# Patient Record
Sex: FEMALE | Race: OTHER | Employment: FULL TIME | URBAN - METROPOLITAN AREA
[De-identification: names, ages, dates, MRNs, and addresses within clinical notes are randomized per-mention and may not be internally consistent; named-entity substitution may affect disease eponyms.]

---

## 2018-09-10 ENCOUNTER — OFFICE VISIT (OUTPATIENT)
Dept: FAMILY MEDICINE CLINIC | Age: 28
End: 2018-09-10

## 2018-09-10 VITALS
SYSTOLIC BLOOD PRESSURE: 100 MMHG | DIASTOLIC BLOOD PRESSURE: 63 MMHG | TEMPERATURE: 98.1 F | HEIGHT: 63 IN | HEART RATE: 58 BPM | RESPIRATION RATE: 16 BRPM | OXYGEN SATURATION: 100 % | BODY MASS INDEX: 18.71 KG/M2 | WEIGHT: 105.6 LBS

## 2018-09-10 DIAGNOSIS — Z00.00 ENCOUNTER FOR PREVENTATIVE ADULT HEALTH CARE EXAMINATION: ICD-10-CM

## 2018-09-10 DIAGNOSIS — Z00.00 LABORATORY TESTS ORDERED AS PART OF A COMPLETE PHYSICAL EXAM (CPE): Primary | ICD-10-CM

## 2018-09-10 NOTE — PROGRESS NOTES
Fred Solorzano is a 25 y.o. female who presents to \[facility]\"" care Patient was previously receiving care at her PCP in [place]. She moved to [place] 1 year ago. Currently not having any acute concerns Review of Systems Review of Systems Constitutional: Negative for fever, malaise/fatigue and weight loss. HENT: Negative for congestion and sore throat. Eyes: Negative for double vision, photophobia and discharge. Respiratory: Negative for cough, shortness of breath and wheezing. Cardiovascular: Negative for chest pain, palpitations and leg swelling. Gastrointestinal: Negative for abdominal pain, constipation, diarrhea, nausea and vomiting. Genitourinary: Negative for dysuria and urgency. Musculoskeletal: Positive for back pain. Negative for joint pain and myalgias. Chronic lower left back pain due to 'curve in back' Skin: Positive for itching. Some itching of small skin tags on right neck Neurological: Negative for dizziness, focal weakness, weakness and headaches. Psychiatric/Behavioral: Negative. Current medications include: No current outpatient prescriptions on file. OB/Gyn History LMP: 2018 Pt's menstrual cycle is regular, every 28 days Birth Control: Tubal Ligation in  Last Pap Smear: 2 years ago, no history of abnormal findings on Pap per patient [id number] G1  female @ 40wks via , no complications G2  male @ 39wks via , no complications G3 200 male @ 40 wks via , no complications Allergies No Known Allergies Past Medical History Past Medical History:  
Diagnosis Date  Scoliosis, unspecified Patient told she has an abnormal curve to her lower back, undergoes physical therapy for lower back pain Past Surgical History Past Surgical History:  
Procedure Laterality Date  HX HERNIA REPAIR    HX TUBAL LIGATION  2015 Family History Family History Problem Relation Age of Onset  Thyroid Disease Mother  Diabetes Maternal Grandmother  Diabetes Maternal Grandfather  Thyroid Disease Sister No FH of breast cancer: No 
No FH of colon cancer: No 
 
Social History Social History  Marital status: , lives at home with  Spouse name: N/A  
 Number of children: 3  
 Years of education: N/A Occupational History [employer]  
  works night shift Social History Main Topics  Smoking status: Never Smoker  Smokeless tobacco: Never Used  Alcohol use No  
 Drug use: No  
 Sexual activity: Yes  
  Partners: Male Immunizations There is no immunization history on file for this patient. Flu Vaccine: Pt will get vaccine at work Health Maintenance Colonoscopy: Not indicated DEXA scan: Not indicated HIV testing: Per patient, negative Hepatitis C testing: Per patient, negative Mammogram: Not indicated Lung cancer screening: Not indicated Objective Vitals Visit Vitals  /63  Pulse (!) 58  Temp 98.1 °F (36.7 °C) (Oral)  Resp 16  
 Ht 5' 3\" (1.6 m)  Wt 105 lb 9.6 oz (47.9 kg)  LMP 09/05/2018  SpO2 100%  BMI 18.71 kg/m2 Physical Exam 
General appearance - alert, well appearing, and in no distress Eyes - pupils equal and reactive, extraocular eye movements intact Ears - bilateral TM's and external ear canals normal 
Nose - normal and patent, no erythema, discharge or polyps Mouth - mucous membranes moist, pharynx normal without lesions Neck - supple, no significant adenopathy, thyroid exam: thyroid is normal in size without nodules or tenderness Chest - clear to auscultation, no wheezes, rales or rhonchi, symmetric air entry Heart - normal rate, regular rhythm, normal S1, S2, no murmurs, rubs, clicks or gallops Abdomen - soft, nontender, nondistended, no masses or organomegaly Neurological - alert, oriented, normal speech, no focal findings or movement disorder noted Musculoskeletal - no joint tenderness, deformity or swelling Extremities - peripheral pulses normal, no pedal edema, no clubbing or cyanosis Skin - normal coloration and turgor, no rashes, no suspicious skin lesions noted Assessment:  
 
Ms. Fransisco Lockett is a 25 y.o. female here to establish care Plan: 1. Encounter for preventative adult health care examination · Patient deferred Pap smear today as she on her menses. She will schedule another appointment for her pap. · Counseled re: diet, exercise, healthy lifestyle · Patient requested referral to Physical Therapy for back pain. Referral ordered at this visit. · Appropriate labs ordered as listed below · Patient will sign medical record release form today to obtain her previous PCP records. 2. Laboratory tests ordered as part of a complete physical exam (CPE) · LIPID PANEL 
· CBC W/O DIFF 
· METABOLIC PANEL, BASIC · TSH 3RD GENERATION Follow-up Disposition: 
Return As needed and for well woman check. I discussed the aforementioned diagnoses with the patient as well as the plan of care. I discussed this patient with Dr. Rebeca Munson (Attending Physician) Signed By:  Gilmer Guerra MD  
 Family Medicine Resident, PGY1

## 2018-09-11 ENCOUNTER — LAB ONLY (OUTPATIENT)
Dept: FAMILY MEDICINE CLINIC | Age: 28
End: 2018-09-11

## 2018-09-11 NOTE — MR AVS SNAPSHOT
[address] 
[phone] Patient: Guerline Silva MRN: [id number] GGD:2/83/3232 Visit Information Date & Time Provider Department Dept. Phone Encounter #  
 9/11/2018 10:30 AM LAB [facility] [phone] [id number] Your Appointments 9/19/2018  9:45 AM  
Complete Physical with Lauren Dozier MD  
[address]) Appt Note: cpe, requesting pap smear [address] [facility] [address]  
[phone]  
  
   
 [address] [phone] Upcoming Health Maintenance Date Due DTaP/Tdap/Td series (1 - Tdap) 6/11/2009 PAP AKA CERVICAL CYTOLOGY 6/11/2009 Influenza Age 5 to Adult 8/1/2018 Allergies as of 9/11/2018  Review Complete On: 9/10/2018 By: Chevy Chu MD  
 No Known Allergies Current Immunizations  Never Reviewed No immunizations on file. Not reviewed this visit Vitals LMP Smoking Status 09/05/2018 Never Smoker Preferred Pharmacy Pharmacy Name Phone [facility] [address] [place] [address] Road [phone] Your Updated Medication List  
  
Notice  As of 9/11/2018  4:56 PM  
 You have not been prescribed any medications. Introducing [facility]! [employer] introduces ONtheAIR patient portal. Now you can access parts of your medical record, email your doctor's office, and request medication refills online. 1. In your internet browser, go to [email or web address] 2. Click on the First Time User? Click Here link in the Sign In box. You will see the New Member Sign Up page. 3. Enter your ONtheAIR Access Code exactly as it appears below. You will not need to use this code after youve completed the sign-up process.  If you do not sign up before the expiration date, you must request a new code. 
 
· Venturesity Access Code: [email or web address] Expires: 12/9/2018  1:26 PM 
 
4. Enter the last four digits of your Social Security Number (xxxx) and Date of Birth (mm/dd/yyyy) as indicated and click Submit. You will be taken to the next sign-up page. 5. Create a Venturesity ID. This will be your Venturesity login ID and cannot be changed, so think of one that is secure and easy to remember. 6. Create a Venturesity password. You can change your password at any time. 7. Enter your Password Reset Question and Answer. This can be used at a later time if you forget your password. 8. Enter your e-mail address. You will receive e-mail notification when new information is available in [address] 9. Click Sign Up. You can now view and download portions of your medical record. 10. Click the Download Summary menu link to download a portable copy of your medical information. If you have questions, please visit the Frequently Asked Questions section of the Venturesity website. Remember, Venturesity is NOT to be used for urgent needs. For medical emergencies, dial 911. Now available from your iPhone and Android! Please provide this summary of care documentation to your next provider. If you have any questions after today's visit, please call [phone].

## 2018-09-12 LAB
BUN SERPL-MCNC: 8 MG/DL (ref 6–20)
BUN/CREAT SERPL: 10 (ref 9–23)
CALCIUM SERPL-MCNC: 9.2 MG/DL (ref 8.7–10.2)
CHLORIDE SERPL-SCNC: 103 MMOL/L (ref 96–106)
CHOLEST SERPL-MCNC: 136 MG/DL (ref 100–199)
CO2 SERPL-SCNC: 22 MMOL/L (ref 20–29)
CREAT SERPL-MCNC: 0.83 MG/DL (ref 0.57–1)
ERYTHROCYTE [DISTWIDTH] IN BLOOD BY AUTOMATED COUNT: 12.5 % (ref 12.3–15.4)
GLUCOSE SERPL-MCNC: 82 MG/DL (ref 65–99)
HCT VFR BLD AUTO: 34.7 % (ref 34–46.6)
HDLC SERPL-MCNC: 58 MG/DL
HGB BLD-MCNC: 11.5 G/DL (ref 11.1–15.9)
INTERPRETATION, 910389: NORMAL
LDLC SERPL CALC-MCNC: 67 MG/DL (ref 0–99)
MCH RBC QN AUTO: 29.7 PG (ref 26.6–33)
MCHC RBC AUTO-ENTMCNC: 33.1 G/DL (ref 31.5–35.7)
MCV RBC AUTO: 90 FL (ref 79–97)
PLATELET # BLD AUTO: 303 X10E3/UL (ref 150–379)
POTASSIUM SERPL-SCNC: 3.4 MMOL/L (ref 3.5–5.2)
RBC # BLD AUTO: 3.87 X10E6/UL (ref 3.77–5.28)
SODIUM SERPL-SCNC: 140 MMOL/L (ref 134–144)
TRIGL SERPL-MCNC: 54 MG/DL (ref 0–149)
TSH SERPL DL<=0.005 MIU/L-ACNC: 1.57 UIU/ML (ref 0.45–4.5)
VLDLC SERPL CALC-MCNC: 11 MG/DL (ref 5–40)
WBC # BLD AUTO: 7.3 X10E3/UL (ref 3.4–10.8)

## 2018-09-13 NOTE — PROGRESS NOTES
CBC, BMP, TSH and Lipid panel all within normal limits. I have filled out the patient's work form with these values and will fax it to the number provided I will also return the original form and a copy of these results to the patient's home address.

## 2018-09-19 ENCOUNTER — OFFICE VISIT (OUTPATIENT)
Dept: FAMILY MEDICINE CLINIC | Age: 28
End: 2018-09-19

## 2018-09-19 ENCOUNTER — HOSPITAL ENCOUNTER (OUTPATIENT)
Dept: LAB | Age: 28
Discharge: HOME | End: 2018-09-19
Payer: COMMERCIAL

## 2018-09-19 VITALS
HEIGHT: 63 IN | SYSTOLIC BLOOD PRESSURE: 90 MMHG | TEMPERATURE: 97.7 F | HEART RATE: 61 BPM | RESPIRATION RATE: 16 BRPM | WEIGHT: 106.6 LBS | DIASTOLIC BLOOD PRESSURE: 53 MMHG | OXYGEN SATURATION: 98 % | BODY MASS INDEX: 18.89 KG/M2

## 2018-09-19 DIAGNOSIS — Z12.4 CERVICAL CANCER SCREENING: ICD-10-CM

## 2018-09-19 DIAGNOSIS — R39.15 URGENCY OF URINATION: Primary | ICD-10-CM

## 2018-09-19 DIAGNOSIS — N84.2 VAGINAL POLYP: ICD-10-CM

## 2018-09-19 DIAGNOSIS — Z11.3 SCREEN FOR STD (SEXUALLY TRANSMITTED DISEASE): ICD-10-CM

## 2018-09-19 LAB
BILIRUB UR QL STRIP: NEGATIVE
GLUCOSE UR-MCNC: NEGATIVE MG/DL
HCG URINE, QL. (POC): NEGATIVE
KETONES P FAST UR STRIP-MCNC: NEGATIVE MG/DL
PH UR STRIP: 6.5 [PH] (ref 4.6–8)
PROT UR QL STRIP: NEGATIVE
SP GR UR STRIP: 1 (ref 1–1.03)
UA UROBILINOGEN AMB POC: NORMAL (ref 0.2–1)
URINALYSIS CLARITY POC: CLEAR
URINALYSIS COLOR POC: YELLOW
URINE BLOOD POC: NEGATIVE
URINE LEUKOCYTES POC: NEGATIVE
URINE NITRITES POC: NEGATIVE
VALID INTERNAL CONTROL?: YES

## 2018-09-19 PROCEDURE — 87491 CHLMYD TRACH DNA AMP PROBE: CPT | Performed by: FAMILY MEDICINE

## 2018-09-19 PROCEDURE — 88175 CYTOPATH C/V AUTO FLUID REDO: CPT | Performed by: FAMILY MEDICINE

## 2018-09-19 PROCEDURE — 87624 HPV HI-RISK TYP POOLED RSLT: CPT | Performed by: FAMILY MEDICINE

## 2018-09-19 PROCEDURE — 87625 HPV TYPES 16 & 18 ONLY: CPT | Performed by: FAMILY MEDICINE

## 2018-09-19 NOTE — PROGRESS NOTES
Davis Baumgarten is a 25 y.o. female  Chief Complaint   Patient presents with    Well Woman     requesting pap smear    Urgency     x1 day and lower abdominal pain x1 day     Visit Vitals    BP 90/53 (BP 1 Location: Right arm, BP Patient Position: Sitting)    Pulse 61    Temp 97.7 °F (36.5 °C) (Oral)    Resp 16    Ht 5' 3\" (1.6 m)    Wt 106 lb 9.6 oz (48.4 kg)    LMP 09/05/2018    SpO2 98%    BMI 18.88 kg/m2     1. Have you been to the ER, urgent care clinic since your last visit? Hospitalized since your last visit? No    2. Have you seen or consulted any other health care providers outside of the [address] [place] [address] since your last visit? Include any pap smears or colon screening.  No  Health Maintenance Due   Topic Date Due    DTaP/Tdap/Td series (1 - Tdap) 06/11/2009    PAP AKA CERVICAL CYTOLOGY  06/11/2009    Influenza Age 9 to Adult  08/01/2018

## 2018-09-19 NOTE — PROGRESS NOTES
History of Present Illness:     Chief Complaint   Patient presents with    Well Woman     requesting pap smear    Urgency     x1 day and lower abdominal pain x1 day     Pt is a 25y.o. year old female    Presents to clinic for pap. Well woman visit done on 9/10. Pap deferred due to being on menses at last visit  Last pap 2 years ago in [place]  Hx of abnormal pap; told her last pap she had HPV   Never needed cervical biopsy or procedure    Currently sexually active with   Desires STD testing    Started having discomfort with urination  Noted yesterday  Symptoms resolved today    Past Medical History:   Diagnosis Date    Scoliosis, unspecified     Patient told she has an abnormal curve to her lower back, undergoes physical therapy for lower back pain         No current outpatient prescriptions on file prior to visit. No current facility-administered medications on file prior to visit. Allergies:  No Known Allergies      Review of Systems:  Denies vaginal discharge, vaginal irritation or itching  +Vaginal pain occasionally      Objective:     Vitals:    09/19/18 0941   BP: 90/53   Pulse: 61   Resp: 16   Temp: 97.7 °F (36.5 °C)   TempSrc: Oral   SpO2: 98%   Weight: 106 lb 9.6 oz (48.4 kg)   Height: 5' 3\" (1.6 m)       Physical Exam:  General appearance - alert, well appearing, and in no distress  Pelvic - VULVA: normal appearing vulva with no masses, tenderness or lesions, VAGINA: normal appearing vagina with normal color and discharge, small, 1cm polyp protruding from introitus , CERVIX: normal appearing cervix without discharge or lesions, UTERUS: uterus is normal size, shape, consistency and nontender, ADNEXA: normal adnexa in size, nontender and no masses, exam chaperoned by Tang Johnson LPN.       Recent Labs:  Recent Results (from the past 12 hour(s))   AMB POC URINALYSIS DIP STICK AUTO W/O MICRO    Collection Time: 09/19/18 10:02 AM   Result Value Ref Range    Color (UA POC) Yellow Clarity (UA POC) Clear     Glucose (UA POC) Negative Negative    Bilirubin (UA POC) Negative Negative    Ketones (UA POC) Negative Negative    Specific gravity (UA POC) 1.005 1.001 - 1.035    Blood (UA POC) Negative Negative    pH (UA POC) 6.5 4.6 - 8.0    Protein (UA POC) Negative Negative    Urobilinogen (UA POC) 0.2 mg/dL 0.2 - 1    Nitrites (UA POC) Negative Negative    Leukocyte esterase (UA POC) Negative Negative   AMB POC URINE PREGNANCY TEST, VISUAL COLOR COMPARISON    Collection Time: 09/19/18 10:43 AM   Result Value Ref Range    VALID INTERNAL CONTROL POC Yes     HCG urine, Ql. (POC) Negative Negative         Assessment and Plan:   Pt is a 25y.o. year old female,      ICD-10-CM ICD-9-CM    1. Urgency of urination R39.15 788.63 AMB POC URINALYSIS DIP STICK AUTO W/O MICRO      AMB POC URINE PREGNANCY TEST, VISUAL COLOR COMPARISON   2. Cervical cancer screening Z12.4 V76.2 PAP IG, CT-NG-TV, APTIMA HPV AND RFX 71/07,98([id number])   3. Screen for STD (sexually transmitted disease) Z11.3 V74.5 HIV 1/2 AG/AB, 4TH GENERATION,W RFLX CONFIRM   4. Vaginal polyp N84.2 623.7      Pap + Gc/Chl done today  HIV ordered    TDaP in 2014 with last pregnancy  Wants to get flu shot at work    Referred to Dr. Tucker Curtis for evaluation of vaginal polyp and possible removal.     Jalen Arango MD      I have discussed the diagnosis with the patient and the intended plan as seen in the above orders. The patient has received an after-visit summary and questions were answered concerning future plans.

## 2018-09-19 NOTE — MR AVS SNAPSHOT
[address] 
[phone] Patient: Earl Grossman MRN: [id number] HBQ:9/67/7981 Visit Information Date & Time Provider Department Dept. Phone Encounter #  
 9/19/2018  9:45 AM Rebeca Ramos, [facility] [phone] [id number] Follow-up Instructions Return for Vaginal polyp with Dr. Tracy Hyatt. Upcoming Health Maintenance Date Due Influenza Age 5 to Adult 11/19/2018* DTaP/Tdap/Td series (1 - Tdap) 12/19/2018* PAP AKA CERVICAL CYTOLOGY 9/19/2021 *Topic was postponed. The date shown is not the original due date. Allergies as of 9/19/2018  Review Complete On: 9/19/2018 By: Edgar Ramos No Known Allergies Current Immunizations  Never Reviewed No immunizations on file. Not reviewed this visit You Were Diagnosed With   
  
 Codes Comments Urgency of urination    -  Primary ICD-10-CM: R39.15 ICD-9-CM: 410.82 Cervical cancer screening     ICD-10-CM: Z12.4 ICD-9-CM: V76.2 Screen for STD (sexually transmitted disease)     ICD-10-CM: Z11.3 ICD-9-CM: V74.5 Vaginal polyp     ICD-10-CM: N12.5 ICD-9-CM: 623.7 Vitals BP Pulse Temp Resp Height(growth percentile) Weight(growth percentile) 90/53 (BP 1 Location: Right arm, BP Patient Position: Sitting) 61 97.7 °F (36.5 °C) (Oral) 16 5' 3\" (1.6 m) 106 lb 9.6 oz (48.4 kg) LMP SpO2 BMI Smoking Status 09/05/2018 98% 18.88 kg/m2 Never Smoker Vitals History BMI and BSA Data Body Mass Index Body Surface Area  
 18.88 kg/m 2 1.47 m 2 Preferred Pharmacy Pharmacy Name Phone [facility] [address] [place] [address] Eduardo Villarreal [phone] Your Updated Medication List  
  
Notice  As of 9/19/2018 10:22 AM  
 You have not been prescribed any medications. We Performed the Following AMB POC URINALYSIS DIP STICK AUTO W/O MICRO [98568 CPT(R)] AMB POC URINE PREGNANCY TEST, VISUAL COLOR COMPARISON [65047 CPT(R)] HIV 1/2 AG/AB, 4TH GENERATION,W RFLX CONFIRM H976192 CPT(R)] PAP IG, CT-NG-TV, APTIMA HPV AND RFX 51/88,31([id number]) [[id number] Custom] Follow-up Instructions Return for Vaginal polyp with Dr. Robinson Ortega! [employer] introduces UpCity patient portal. Now you can access parts of your medical record, email your doctor's office, and request medication refills online. 1. In your internet browser, go to [email or web address] 2. Click on the First Time User? Click Here link in the Sign In box. You will see the New Member Sign Up page. 3. Enter your UpCity Access Code exactly as it appears below. You will not need to use this code after youve completed the sign-up process. If you do not sign up before the expiration date, you must request a new code. · UpCity Access Code: [email or web address] Expires: 12/9/2018  1:26 PM 
 
4. Enter the last four digits of your Social Security Number (xxxx) and Date of Birth (mm/dd/yyyy) as indicated and click Submit. You will be taken to the next sign-up page. 5. Create a UpCity ID. This will be your UpCity login ID and cannot be changed, so think of one that is secure and easy to remember. 6. Create a UpCity password. You can change your password at any time. 7. Enter your Password Reset Question and Answer. This can be used at a later time if you forget your password. 8. Enter your e-mail address. You will receive e-mail notification when new information is available in [address] 9. Click Sign Up. You can now view and download portions of your medical record. 10. Click the Download Summary menu link to download a portable copy of your medical information.  
 
If you have questions, please visit the Frequently Asked Questions section of the PromoRepublic. Remember, [employer] is NOT to be used for urgent needs. For medical emergencies, dial 911. Now available from your iPhone and Android! Please provide this summary of care documentation to your next provider. If you have any questions after today's visit, please call [phone].

## 2018-09-20 LAB — HIV 1+2 AB+HIV1 P24 AG SERPL QL IA: NON REACTIVE

## 2018-09-21 LAB
C TRACH RRNA SPEC QL NAA+PROBE: NEGATIVE
N GONORRHOEA RRNA SPEC QL NAA+PROBE: NEGATIVE
SPECIMEN SOURCE: NORMAL
T VAGINALIS RRNA SPEC QL NAA+PROBE: NEGATIVE

## 2018-09-25 ENCOUNTER — TELEPHONE (OUTPATIENT)
Dept: FAMILY MEDICINE CLINIC | Age: 28
End: 2018-09-25

## 2018-09-25 NOTE — TELEPHONE ENCOUNTER
Took call from Becca. Patient returning call in regards to lab results. Informed patient per Dr Krish Herron that her Gc/Chl was negative and we are still awaiting her Pap results. Patient verbalized understanding.

## 2018-09-25 NOTE — TELEPHONE ENCOUNTER
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     Nurse Rukhsana Ngo took call

## 2018-09-25 NOTE — TELEPHONE ENCOUNTER
Attempted to call patient per Dr Monique Gr to inform patient of negative Gc/Chl and Pap result to follow. Unable to obtain patient, LVM to return call.

## 2018-09-27 NOTE — PROGRESS NOTES
Normal pap with POSITIVE HPV  Negative for HR HPV strains  Repeat co-testing in 1 year    Please call and notify patient the following:  - Normal cervical cells on pap  - HPV testing was positive  - Needs repeat pap in 1 year

## 2018-09-28 ENCOUNTER — TELEPHONE (OUTPATIENT)
Dept: FAMILY MEDICINE CLINIC | Age: 28
End: 2018-09-28

## 2018-09-28 NOTE — TELEPHONE ENCOUNTER
Verified patient by 2 identifiers. Informed patient per Dr Vikas Pyle of her Pap results: normal cervical cells on pap; HPV testing was positive; and needs to be repeated in 1 year. Patient verbalized understanding of results but questioned the results of her HIV testing. Informed her per Dr Vikas Pyle notes her HIV testing was negative. Patient verbalized understanding.

## 2018-10-01 PROBLEM — R87.820 PAPANICOLAOU SMEAR OF CERVIX WITH LOW RISK HUMAN PAPILLOMAVIRUS (HPV) DNA TEST POSITIVE: Status: ACTIVE | Noted: 2018-10-01

## 2018-10-18 ENCOUNTER — OFFICE VISIT (OUTPATIENT)
Dept: FAMILY MEDICINE CLINIC | Age: 28
End: 2018-10-18

## 2018-10-18 ENCOUNTER — HOSPITAL ENCOUNTER (OUTPATIENT)
Dept: LAB | Age: 28
Discharge: HOME | End: 2018-10-18

## 2018-10-18 VITALS
HEART RATE: 56 BPM | WEIGHT: 109 LBS | SYSTOLIC BLOOD PRESSURE: 101 MMHG | HEIGHT: 63 IN | RESPIRATION RATE: 16 BRPM | OXYGEN SATURATION: 100 % | BODY MASS INDEX: 19.31 KG/M2 | DIASTOLIC BLOOD PRESSURE: 66 MMHG | TEMPERATURE: 97.9 F

## 2018-10-18 DIAGNOSIS — N84.2 VAGINAL POLYP: ICD-10-CM

## 2018-10-18 DIAGNOSIS — L91.8 SKIN TAGS, MULTIPLE ACQUIRED: ICD-10-CM

## 2018-10-18 DIAGNOSIS — B36.0 TINEA VERSICOLOR: Primary | ICD-10-CM

## 2018-10-18 RX ORDER — FLUCONAZOLE 150 MG/1
TABLET ORAL
Qty: 4 TAB | Refills: 0 | Status: SHIPPED | OUTPATIENT
Start: 2018-10-18 | End: 2019-06-06

## 2018-10-18 NOTE — PROGRESS NOTES
Chief Complaint Patient presents with  Gyn Exam  
 
1. Have you been to the ER, urgent care clinic since your last visit? Hospitalized since your last visit? No 
 
2. Have you seen or consulted any other health care providers outside of the [address] [place] [address] since your last visit? Include any pap smears or colon screening.  No

## 2018-10-18 NOTE — PATIENT INSTRUCTIONS
Tinea Versicolor: Care Instructions Your Care Instructions Tinea versicolor is a skin infection caused by a yeast (fungus). It causes many small spots, usually on the chest and back. The spotted skin can be flaky or scaly. The spots do not tan in the sun, so they are lighter than the skin around them. Some spots may be darker than the skin around them. The yeast that causes tinea versicolor normally lives on your skin. But it becomes a problem only when warmth and humidity allow the yeast to grow rapidly and increase in number. Some people are more likely to get tinea versicolor. It does not spread from person to person. Tinea versicolor usually gets better as you age. You can treat tinea versicolor with cream or ointment that kills the yeast. You may need pills to kill the fungus if the spots cover a lot of your body. Although treatment kills the yeast quickly, your skin may not return to normal for months after treatment. You can get this condition again after treatment. Follow-up care is a key part of your treatment and safety. Be sure to make and go to all appointments, and call your doctor if you are having problems. It's also a good idea to know your test results and keep a list of the medicines you take. How can you care for yourself at home? · Follow the directions for use of creams, shampoos, or solutions. You will probably need to use them for 1 to 2 weeks. If your skin gets irritated, stop using the product, and call your doctor. · To prevent tinea versicolor, use a cream, shampoo, or solution one time a month. Your doctor may prescribe pills to prevent the spots from returning. · Dry off well after bathing. Keep your skin clean and dry. · Always wear sunscreen on exposed skin. Make sure to use a broad-spectrum sunscreen that has a sun protection factor (SPF) of 30 or higher. Use it every day, even when it is cloudy.  
· If you keep getting tinea versicolor, wash your clothes in very hot water to kill the yeast. 
When should you call for help? Call your doctor now or seek immediate medical care if: 
  · You have signs of infection such as: 
? Pain, warmth, or swelling in your skin. ? Red streaks near a wound in your skin. ? Pus coming from a wound in your skin. ? A fever.  
 Watch closely for changes in your health, and be sure to contact your doctor if: 
  · Your skin condition does not improve in 2 weeks.  
  · You do not get better as expected. Where can you learn more? Go to http://hayes-daryl.info/. Enter L216 in the search box to learn more about \"Tinea Versicolor: Care Instructions. \" Current as of: April 18, 2018 Content Version: 11.8 © 6948-6557 [employer]. Care instructions adapted under license by [employer] [place] (which disclaims liability or warranty for this information). If you have questions about a medical condition or this instruction, always ask your healthcare professional. [place] [address] any warranty or liability for your use of this information.

## 2018-10-18 NOTE — PROGRESS NOTES
Subjective:  
Anabel Adam is a 25 y.o. female who presents with concerns about vaginal polyp. Current symptoms are: pt told she has a vaginal polyp and was referred to me for further evaluation and possible biopsy. Unknown duration of time, but has noticed it when trying to insert tampon or when having sex. Seems there's some resistance though no real pain. Pt wonders if it started after a laceration repair following  in the past.  Last delivery was in . HPV+, NILM pap in 2018. Was told she needed repeat pap in 1 year. STD exposure: denies knowledge of risky exposure. Previous STD history: none Also wants to discuss multiple skin tags on neck area. Feels like she's getting more. Thinks it could be related to being in the sun. No bleeding or irritation. Also with a \"white\" rash on her shoulders and chest, down to abdomen. Thinks it's versicolor (looked it up online) and was treated by another doctor topically, including with Head and Shoulders shampoo. States it didn't help. Spreading. Allergies- reviewed:  
No Known Allergies Medications- reviewed:  
Current Outpatient Medications Medication Sig  
 fluconazole (DIFLUCAN) 150 mg tablet Take 2 tabs by mouth once weekly for 2 weeks No current facility-administered medications for this visit. Past Medical History- reviewed: 
Past Medical History:  
Diagnosis Date  Scoliosis, unspecified Patient told she has an abnormal curve to her lower back, undergoes physical therapy for lower back pain Past Surgical History- reviewed:  
Past Surgical History:  
Procedure Laterality Date  HX HERNIA REPAIR    HX TUBAL LIGATION   Social History- reviewed: 
Social History Socioeconomic History  Marital status: SINGLE Spouse name: Not on file  Number of children: 3  
 Years of education: Not on file  Highest education level: Not on file Social Needs  Financial resource strain: Not on file  Food insecurity - worry: Not on file  Food insecurity - inability: Not on file  Transportation needs - medical: Not on file  Transportation needs - non-medical: Not on file Occupational History  Occupation: Housekeeping Employer: [employer] Comment: works night shift Tobacco Use  Smoking status: Never Smoker  Smokeless tobacco: Never Used Substance and Sexual Activity  Alcohol use: No  
 Drug use: No  
 Sexual activity: Yes  
  Partners: Male Comment: tubal ligation, 2015 Other Topics Concern  Not on file Social History Narrative  Not on file Review of Systems General: No fevers or chills GI: No abdominal pain, nausea, or vomiting Skin: hypopigmented rash, and multiple skin tags Physical Exam  
 
Visit Vitals /66 Pulse (!) 56 Temp 97.9 °F (36.6 °C) (Oral) Resp 16 Ht 5' 3\" (1.6 m) Wt 109 lb (49.4 kg) LMP 10/01/2018 (Approximate) SpO2 100% BMI 19.31 kg/m² General: No apparent distress. Alert and oriented. Responds to all questions appropriately. Skin:  Multiple freckles and hyperpigmented pinpoint skin tags on neck area. There is a diffuse hypopigmented blotchy dermatitis on shoulders extending to mid back and chest.    
: Exam chaperoned by Tayla Phillip LPN. There is a think white discharge present. Speculum exam normal with normal appearing pink vaginal tissue. There is a area of protruding tissue starting at the level of the perineum and protruding slightly from the introitus. Pink/flesh colored in appearance without irregular borders. [facility] MEDICINE CTROFFICE PROCEDURE PROGRESS NOTE Chart reviewed for the following: 
 Wolf HODGSON 81, DO, have reviewed the History, Physical and updated the Allergic reactions for MEAGAN Lu TIME OUT performed immediately prior to start of procedure: IAndrade DO, have performed the following reviews on Delmi Lee prior to the start of the procedure: 
         
* Patient was identified by name and date of birth * Agreement on procedure being performed was verified * Risks and Benefits explained to the patient * Procedure site verified and marked as necessary * Patient was positioned for comfort * Consent was signed and verified Time: 11:15 Date of procedure: 10/18/2018 Procedure performed by: Margie Birmingham DO 
 
Provider assisted by: Gregory Ricardo LPN Patient assisted by: self How tolerated by patient: tolerated the procedure well with no complications Post Procedural Pain Scale: 0 - No Hurt Comments: none Precautions were discussed prior to procedure including bleeding, infection, scarring, scar tissue formation and nerve damage. Pt desired to proceed. All questions answered prior to start of the procedure. Area was fully visualized and a Genoveva Manuel forcep was used to grasp and take biospy of the tissue in normal sterile fashion. There was no bleeding. No complications. Assessment/Plan: ICD-10-CM ICD-9-CM 1. Tinea versicolor B36.0 111.0 fluconazole (DIFLUCAN) 150 mg tablet 2. Skin tags, multiple acquired L91.8 701.9 REFERRAL TO DERMATOLOGY 3. Vaginal polyp N84.2 623.7 SURGICAL PATHOLOGY PLAN:  
Will treat tinea versicolor systemically, as pt has failed topical therapy. Referral to derm for evaluation of skin tag removal given location head/neck. Biopsy taken today of vaginal tissue. Most likely vaginal polyp versus normal vaginal tissue that has healed in an irregular manner following vaginal laceration repair with last . Procedure precautions discussed. No orders of the defined types were placed in this encounter.  
 
 
 
I have discussed the diagnosis with the patient and the intended plan as seen in the above orders. The patient has received an after-visit summary and questions were answered concerning future plans. I have discussed medication side effects and warnings with the patient as well.  
 
Andrade Kahn, DO

## 2018-11-08 ENCOUNTER — TELEPHONE (OUTPATIENT)
Dept: FAMILY MEDICINE CLINIC | Age: 28
End: 2018-11-08

## 2018-11-08 NOTE — TELEPHONE ENCOUNTER
----- Message from Ange Villar DO sent at 11/7/2018  3:06 PM EST -----  Francoise Huang, please let this patient know her biopsy was completely normal.  No need for any further treatment of the area. Thanks  MVyossi     ----- Message -----  From: Lauren [facility] In 7 2.3 Results  Sent: 11/6/2018   1:19 PM  To:  Andrade Kahn DO

## 2018-12-07 ENCOUNTER — OFFICE VISIT (OUTPATIENT)
Dept: FAMILY MEDICINE CLINIC | Age: 28
End: 2018-12-07

## 2018-12-07 VITALS
SYSTOLIC BLOOD PRESSURE: 106 MMHG | TEMPERATURE: 96.2 F | OXYGEN SATURATION: 99 % | RESPIRATION RATE: 18 BRPM | DIASTOLIC BLOOD PRESSURE: 63 MMHG | WEIGHT: 106 LBS | HEIGHT: 63 IN | BODY MASS INDEX: 18.78 KG/M2 | HEART RATE: 60 BPM

## 2018-12-07 DIAGNOSIS — F32.1 MODERATE MAJOR DEPRESSION (HCC): Primary | ICD-10-CM

## 2018-12-07 DIAGNOSIS — M54.32 SCIATICA OF LEFT SIDE: ICD-10-CM

## 2018-12-07 RX ORDER — SERTRALINE HYDROCHLORIDE 50 MG/1
50 TABLET, FILM COATED ORAL DAILY
Qty: 30 TAB | Refills: 1 | Status: SHIPPED | OUTPATIENT
Start: 2018-12-07 | End: 2018-12-20 | Stop reason: SDUPTHER

## 2018-12-07 NOTE — PROGRESS NOTES
[address] [facility], [address] with [address] [facility]     Chief Complaint: Jose G Reach, difficulty sleeping. \"    Subjective  Jenny Browning is an 25 y.o. female who presents for difficulty sleeping. Has been present for 2 weeks. Usually goes to bed at 9:30am every day (works night shift). Wakes up every hour. She is usually up by 2-3pm (sometimes earlier). Never had trouble with sleep before. Worries frequently--having some difficulties with her . Son has ADD. Daughter has issues in school. Was going to therapy, but stopped. Her  wonders if she has depression--never been formerly diagnosed. Also complaining of generalized headaches which seem to worsen when she does not get enough sleep. PHQ over the last two weeks 12/7/2018   Little interest or pleasure in doing things Nearly every day   Feeling down, depressed, irritable, or hopeless More than half the days   Total Score PHQ 2 5   Trouble falling or staying asleep, or sleeping too much Nearly every day   Feeling tired or having little energy Nearly every day   Poor appetite, weight loss, or overeating Several days   Feeling bad about yourself - or that you are a failure or have let yourself or your family down Several days   Trouble concentrating on things such as school, work, reading, or watching TV Several days   Moving or speaking so slowly that other people could have noticed; or the opposite being so fidgety that others notice Not at all   Thoughts of being better off dead, or hurting yourself in some way Not at all   PHQ 9 Score 14     Has a history of sciatica (left-sided). States that this has been worsening over the past several days. Wonders if it is related to her stress. Sees a chriopractor--noted to have scoliosis. Ibuprofen is helping some. No urinary symptoms, saddle anesthesia, fever.     Allergies - reviewed:   No Known Allergies    Medications - reviewed:   Current Outpatient Medications Medication Sig    sertraline (ZOLOFT) 50 mg tablet Take 1 Tab by mouth daily.  fluconazole (DIFLUCAN) 150 mg tablet Take 2 tabs by mouth once weekly for 2 weeks     No current facility-administered medications for this visit. I have reviewed and updated the histories as listed below:    Past Medical History - reviewed:  Past Medical History:   Diagnosis Date    Scoliosis, unspecified     Patient told she has an abnormal curve to her lower back, undergoes physical therapy for lower back pain         Past Surgical History - reviewed:   Past Surgical History:   Procedure Laterality Date    HX HERNIA REPAIR  2015    HX TUBAL LIGATION  2015         Social History - reviewed:  Social History     Socioeconomic History    Marital status: SINGLE     Spouse name: Not on file    Number of children: 3    Years of education: Not on file    Highest education level: Not on file   Social Needs    Financial resource strain: Not on file    Food insecurity - worry: Not on file    Food insecurity - inability: Not on file   [employer] needs - medical: Not on file   Posterous needs - non-medical: Not on file   Occupational History    Occupation: Housekeeping     Employer: [employer] Kristi Gilmore: works night shift    Tobacco Use    Smoking status: Never Smoker    Smokeless tobacco: Never Used   Substance and Sexual Activity    Alcohol use: No    Drug use: No    Sexual activity: Yes     Partners: Male     Comment: tubal ligation, 2015   Other Topics Concern    Not on file   Social History Narrative    Not on file         Family History - reviewed:  Family History   Problem Relation Age of Onset    Thyroid Disease Mother     Diabetes Maternal Grandmother     Diabetes Maternal Grandfather     Thyroid Disease Sister          Immunizations - reviewed: There is no immunization history on file for this patient. Flu: received this season.     Review of Systems  Review of Systems Constitutional: Negative for chills and fever. Respiratory: Negative for shortness of breath. Cardiovascular: Negative for chest pain. Gastrointestinal: Negative for constipation, nausea and vomiting. Musculoskeletal: Positive for back pain. Neurological: Positive for headaches. Psychiatric/Behavioral: Positive for dysphoric mood and sleep disturbance. Negative for suicidal ideas. The patient is nervous/anxious. Physical Exam    Visit Vitals  /63   Pulse 60   Temp 96.2 °F (35.7 °C) (Oral)   Resp 18   Ht 5' 3\" (1.6 m)   Wt 106 lb (48.1 kg)   SpO2 99%   BMI 18.78 kg/m²       Physical Exam   Constitutional: She is oriented to person, place, and time. She appears well-nourished. No distress. HENT:   Head: Normocephalic. Right Ear: Hearing, tympanic membrane, external ear and ear canal normal.   Left Ear: Hearing, tympanic membrane, external ear and ear canal normal.   Mouth/Throat: Oropharynx is clear and moist. No oropharyngeal exudate. Eyes: Conjunctivae and EOM are normal. Pupils are equal, round, and reactive to light. Right eye exhibits no discharge. Left eye exhibits no discharge. Neck: Normal range of motion. Neck supple. No thyromegaly present. Cardiovascular: Normal rate, regular rhythm, normal heart sounds and intact distal pulses. Exam reveals no gallop and no friction rub. No murmur heard. Pulmonary/Chest: Effort normal and breath sounds normal. No respiratory distress. She has no wheezes. She has no rales. She exhibits no tenderness. Abdominal: Soft. Bowel sounds are normal. She exhibits no distension and no mass. There is no tenderness. There is no rebound and no guarding. Musculoskeletal: Normal range of motion. She exhibits no edema. Lumbar back: She exhibits tenderness (left paraspinal tenderness). Lymphadenopathy:     She has no cervical adenopathy. Neurological: She is alert and oriented to person, place, and time. She has normal strength.  No cranial nerve deficit. Skin: Skin is warm and dry. She is not diaphoretic. No erythema. Psychiatric: Her speech is normal and behavior is normal. Judgment and thought content normal. Cognition and memory are normal. She exhibits a depressed mood (tearful). She expresses no homicidal and no suicidal ideation. She expresses no suicidal plans and no homicidal plans. Nursing note and vitals reviewed. Assessment    ICD-10-CM ICD-9-CM    1. Moderate major depression ([facility]) F32.1 296.22 REFERRAL TO PSYCHOLOGY      sertraline (ZOLOFT) 50 mg tablet   2. Sciatica of left side M54.32 724.3      Plan  1. Moderate major depression ([address] - likely cause of sleeping difficulty. Will trial zoloft for now. Referred back to psych. No SI/HI today. Denies any abuse in home. Feels safe there. I will plan to follow up with her in 2 weeks time.  - REFERRAL TO PSYCHOLOGY  - sertraline (ZOLOFT) 50 mg tablet; Take 1 Tab by mouth daily. Dispense: 30 Tab; Refill: 1    2. Sciatica of left side - chronic issue for patient. Following with chiropractor. Recommended OTC anti-inflammatories for now. Can consider short steroid course if still present when she returns in 2 weeks. Follow-up Disposition:  Return in about 2 weeks (around 12/21/2018) for depression follow up. I have discussed the diagnosis with the patient and the intended plan as seen in the above orders. Patient verbalized understanding of the plan and agrees with the plan. The patient has received an after-visit summary and questions were answered concerning future plans. I have discussed medication side effects and warnings with the patient as well. Informed patient to return to the office if new symptoms arise.     Patient was discussed with Dr. Jez Quinn MD  Family Medicine Resident

## 2018-12-07 NOTE — PROGRESS NOTES
[address] Medicine Residency Attending Addendum:  Patient encounter was discussed on the day of the encounter with Bryon Daniels MD, performing the key elements of the service. I discussed the findings, assessment and plan with the resident and agree with the resident's findings and plan as documented in the resident's note.       Deo Cam MD, CAQSM, RMSK

## 2018-12-07 NOTE — PATIENT INSTRUCTIONS
[address] Office  Phone: [phone]  FAX: [phone]  [address] [facility], [address]  Phone: [phone]  FAX: [phone]  201 [facility], [address]  [facility] 1309 [facility] Office  [phone]  FAX: [phone] [address] [facility] 41, [facility]    [facility] Office  [phone]  FAX: 800 [facility], 295 [facility], [facility] [address] [facility] [place] Location  [address]  [place] [address]    [facility] Karlie Freeman, Ph.D  Catrina Carmen [address] [facility], [address] [facility] [address]   [phone]    The [address] [facility] Office  [address] [facility], [address]  [phone]    [facility] Office  [address], 15 [facility]  [phone]    [facility] ([place] near [facility])  [phone]    [address] [facility] and [facility] [employer] - Psychiatrist Takes Medicaid  [address] [place], [address], [address]  200 [facility], LCSW  [address] 2601 [facility] 33  [phone]    Aliyah Gamze (for residents of [facility])  [address] Address  [facility] [address]  Phone Numbers  [phone]    [phone]  Crisis Intervention  24 Hours/Day   [phone]      Recovering From Depression: Care Instructions  Your Care Instructions    Taking good care of yourself is important as you recover from depression. In time, your symptoms will fade as your treatment takes hold. Do not give up. Instead, focus your energy on getting better. Your mood will improve. It just takes some time.  Focus on things that can help you feel better, such as being with friends and family, eating well, and getting enough rest. But take things slowly. Do not do too much too soon. You will begin to feel better gradually. Follow-up care is a key part of your treatment and safety. Be sure to make and go to all appointments, and call your doctor if you are having problems. It's also a good idea to know your test results and keep a list of the medicines you take. How can you care for yourself at home? Be realistic  · If you have a large task to do, break it up into smaller steps you can handle, and just do what you can. · You may want to put off important decisions until your depression has lifted. If you have plans that will have a major impact on your life, such as marriage, divorce, or a job change, try to wait a bit. Talk it over with friends and loved ones who can help you look at the overall picture first.  · Reaching out to people for help is important. Do not isolate yourself. Let your family and friends help you. Find someone you can trust and confide in, and talk to that person. · Be patient, and be kind to yourself. Remember that depression is not your fault and is not something you can overcome with willpower alone. Treatment is necessary for depression, just like for any other illness. Feeling better takes time, and your mood will improve little by little. Stay active  · Stay busy and get outside. Take a walk, or try some other light exercise. · Talk with your doctor about an exercise program. Exercise can help with mild depression. · Go to a movie or concert. Take part in a Jainism activity or other social gathering. Go to a ball game. · Ask a friend to have dinner with you. Take care of yourself  · Eat a balanced diet with plenty of fresh fruits and vegetables, whole grains, and lean protein. If you have lost your appetite, eat small snacks rather than large meals. · Avoid drinking alcohol or using illegal drugs. Do not take medicines that have not been prescribed for you.  They may interfere with medicines you may be taking for depression, or they may make your depression worse. · Take your medicines exactly as they are prescribed. You may start to feel better within 1 to 3 weeks of taking antidepressant medicine. But it can take as many as 6 to 8 weeks to see more improvement. If you have questions or concerns about your medicines, or if you do not notice any improvement by 3 weeks, talk to your doctor. · If you have any side effects from your medicine, tell your doctor. Antidepressants can make you feel tired, dizzy, or nervous. Some people have dry mouth, constipation, headaches, sexual problems, or diarrhea. Many of these side effects are mild and will go away on their own after you have been taking the medicine for a few weeks. Some may last longer. Talk to your doctor if side effects are bothering you too much. You might be able to try a different medicine. · Get enough sleep. If you have problems sleeping:  ? Go to bed at the same time every night, and get up at the same time every morning. ? Keep your bedroom dark and quiet. ? Do not exercise after 5:00 p.m.  ? Avoid drinks with caffeine after 5:00 p.m. · Avoid sleeping pills unless they are prescribed by the doctor treating your depression. Sleeping pills may make you groggy during the day, and they may interact with other medicine you are taking. · If you have any other illnesses, such as diabetes, heart disease, or high blood pressure, make sure to continue with your treatment. Tell your doctor about all of the medicines you take, including those with or without a prescription. · Keep the numbers for these national suicide hotlines: 5-939-172-TALK (9-713.609.3083) and 1-793-ZSHJFMM (8-739.976.6129). If you or someone you know talks about suicide or feeling hopeless, get help right away. When should you call for help? Call 911 anytime you think you may need emergency care.  For example, call if:    · You feel like hurting yourself or someone else.     · Someone you know has depression and is about to attempt or is attempting suicide.   [facility] your doctor now or seek immediate medical care if:    · You hear voices.     · Someone you know has depression and:  ? Starts to give away his or her possessions. ? Uses illegal drugs or drinks alcohol heavily. ? Talks or writes about death, including writing suicide notes or talking about guns, knives, or pills. ? Starts to spend a lot of time alone. ? Acts very aggressively or suddenly appears calm.    Watch closely for changes in your health, and be sure to contact your doctor if:    · You do not get better as expected. Where can you learn more? Go to http://hayesKiwi Semiconductordaryl.info/. Enter W829 in the search box to learn more about \"Recovering From Depression: Care Instructions. \"  Current as of: December 7, 2017  Content Version: 11.8  © 7615-1913 "[employer]. Care instructions adapted under license by [employer] (which disclaims liability or warranty for this information). If you have questions about a medical condition or this instruction, always ask your healthcare professional. [place] [address] any warranty or liability for your use of this information. Low Back Pain: Exercises  Your Care Instructions  Here are some examples of typical rehabilitation exercises for your condition. Start each exercise slowly. Ease off the exercise if you start to have pain. Your doctor or physical therapist will tell you when you can start these exercises and which ones will work best for you. How to do the exercises  Press-up    1. Lie on your stomach, supporting your body with your forearms. 2. Press your elbows down into the floor to raise your upper back. As you do this, relax your stomach muscles and allow your back to arch without using your back muscles. As your press up, do not let your hips or pelvis come off the floor.   3. Hold for 15 to 30 seconds, then relax.  4. Repeat 2 to 4 times. Alternate arm and leg (bird dog) exercise    1. Start on the floor, on your hands and knees. 2. Tighten your belly muscles. 3. Raise one leg off the floor, and hold it straight out behind you. Be careful not to let your hip drop down, because that will twist your trunk. 4. Hold for about 6 seconds, then lower your leg and switch to the other leg. 5. Repeat 8 to 12 times on each leg. 6. Over time, work up to holding for 10 to 30 seconds each time. 7. If you feel stable and secure with your leg raised, try raising the opposite arm straight out in front of you at the same time. Knee-to-chest exercise    1. Lie on your back with your knees bent and your feet flat on the floor. 2. Bring one knee to your chest, keeping the other foot flat on the floor (or keeping the other leg straight, whichever feels better on your lower back). 3. Keep your lower back pressed to the floor. Hold for at least 15 to 30 seconds. 4. Relax, and lower the knee to the starting position. 5. Repeat with the other leg. Repeat 2 to 4 times with each leg. 6. To get more stretch, put your other leg flat on the floor while pulling your knee to your chest.    Curl-ups    1. Lie on the floor on your back with your knees bent at a 90-degree angle. Your feet should be flat on the floor, about 12 inches from your buttocks. 2. Cross your arms over your chest. If this bothers your neck, try putting your hands behind your neck (not your head), with your elbows spread apart. 3. Slowly tighten your belly muscles and raise your shoulder blades off the floor. 4. Keep your head in line with your body, and do not press your chin to your chest.  5. Hold this position for 1 or 2 seconds, then slowly lower yourself back down to the floor. 6. Repeat 8 to 12 times. Pelvic tilt exercise    1. Lie on your back with your knees bent. 2. \"Brace\" your stomach.  This means to tighten your muscles by pulling in and imagining your belly button moving toward your spine. You should feel like your back is pressing to the floor and your hips and pelvis are rocking back. 3. Hold for about 6 seconds while you breathe smoothly. 4. Repeat 8 to 12 times. Heel dig bridging    1. Lie on your back with both knees bent and your ankles bent so that only your heels are digging into the floor. Your knees should be bent about 90 degrees. 2. Then push your heels into the floor, squeeze your buttocks, and lift your hips off the floor until your shoulders, hips, and knees are all in a straight line. 3. Hold for about 6 seconds as you continue to breathe normally, and then slowly lower your hips back down to the floor and rest for up to 10 seconds. 4. Do 8 to 12 repetitions. Hamstring stretch in doorway    1. Lie on your back in a doorway, with one leg through the open door. 2. Slide your leg up the wall to straighten your knee. You should feel a gentle stretch down the back of your leg. 3. Hold the stretch for at least 15 to 30 seconds. Do not arch your back, point your toes, or bend either knee. Keep one heel touching the floor and the other heel touching the wall. 4. Repeat with your other leg. 5. Do 2 to 4 times for each leg. Hip flexor stretch    1. Kneel on the floor with one knee bent and one leg behind you. Place your forward knee over your foot. Keep your other knee touching the floor. 2. Slowly push your hips forward until you feel a stretch in the upper thigh of your rear leg. 3. Hold the stretch for at least 15 to 30 seconds. Repeat with your other leg. 4. Do 2 to 4 times on each side. Wall sit    1. Stand with your back 10 to 12 inches away from a wall. 2. Lean into the wall until your back is flat against it. 3. Slowly slide down until your knees are slightly bent, pressing your lower back into the wall. 4. Hold for about 6 seconds, then slide back up the wall. 5. Repeat 8 to 12 times.     Follow-up care is a key part of your treatment and safety. Be sure to make and go to all appointments, and call your doctor if you are having problems. It's also a good idea to know your test results and keep a list of the medicines you take. Where can you learn more? Go to http://hayes-daryl.info/. Enter S343 in the search box to learn more about \"Low Back Pain: Exercises. \"  Current as of: November 29, 2017  Content Version: 11.8  © 5810-2522 [employer]. Care instructions adapted under license by [employer] (which disclaims liability or warranty for this information). If you have questions about a medical condition or this instruction, always ask your healthcare professional. [address] any warranty or liability for your use of this information.

## 2018-12-20 ENCOUNTER — OFFICE VISIT (OUTPATIENT)
Dept: FAMILY MEDICINE CLINIC | Age: 28
End: 2018-12-20

## 2018-12-20 VITALS
TEMPERATURE: 97.7 F | HEART RATE: 62 BPM | DIASTOLIC BLOOD PRESSURE: 67 MMHG | HEIGHT: 63 IN | WEIGHT: 106 LBS | BODY MASS INDEX: 18.78 KG/M2 | OXYGEN SATURATION: 98 % | SYSTOLIC BLOOD PRESSURE: 103 MMHG | RESPIRATION RATE: 16 BRPM

## 2018-12-20 DIAGNOSIS — F51.05 INSOMNIA DUE TO MENTAL CONDITION: ICD-10-CM

## 2018-12-20 DIAGNOSIS — F32.1 MODERATE MAJOR DEPRESSION (HCC): Primary | ICD-10-CM

## 2018-12-20 RX ORDER — SERTRALINE HYDROCHLORIDE 50 MG/1
50 TABLET, FILM COATED ORAL DAILY
Qty: 30 TAB | Refills: 1 | Status: SHIPPED | OUTPATIENT
Start: 2018-12-20 | End: 2019-01-17 | Stop reason: SDUPTHER

## 2018-12-20 NOTE — PROGRESS NOTES
[address] [place] [address] with [address] [place] [address]     Chief Complaint: \"f/u depression\"    Subjective  Alcides Trevizo is an 25 y.o. female who presents for depression follow up. Recently started on zoloft due to sleep issues and depressive symptoms. States that her symptoms have greatly improved. Some stressors have improved at home with her  as well. Reports to feel safe there. Did have some nausea with zoloft at first, but that is resolved. PHQ over the last two weeks 12/20/2018   Little interest or pleasure in doing things Not at all   Feeling down, depressed, irritable, or hopeless Not at all   Total Score PHQ 2 0   Trouble falling or staying asleep, or sleeping too much Nearly every day   Feeling tired or having little energy Not at all   Poor appetite, weight loss, or overeating Several days   Feeling bad about yourself - or that you are a failure or have let yourself or your family down Not at all   Trouble concentrating on things such as school, work, reading, or watching TV Not at all   Moving or speaking so slowly that other people could have noticed; or the opposite being so fidgety that others notice Not at all   Thoughts of being better off dead, or hurting yourself in some way Not at all   PHQ 9 Score 4     Still complaining of issues sleeping. Worse with swing shift hours at work. Looks at her phone and watches TV up until bedtime. Melatonin not helping. Allergies - reviewed:   No Known Allergies    Medications - reviewed:   Current Outpatient Medications   Medication Sig    sertraline (ZOLOFT) 50 mg tablet Take 1 Tab by mouth daily.  fluconazole (DIFLUCAN) 150 mg tablet Take 2 tabs by mouth once weekly for 2 weeks     No current facility-administered medications for this visit.         I have reviewed and updated the histories as listed below:    Past Medical History - reviewed:  Past Medical History:   Diagnosis Date    Scoliosis, unspecified Patient told she has an abnormal curve to her lower back, undergoes physical therapy for lower back pain         Past Surgical History - reviewed:   Past Surgical History:   Procedure Laterality Date    HX HERNIA REPAIR  2015    HX TUBAL LIGATION  2015         Social History - reviewed:  Social History     Socioeconomic History    Marital status: SINGLE     Spouse name: Not on file    Number of children: 3    Years of education: Not on file    Highest education level: Not on file   Social Needs    Financial resource strain: Not on file    Food insecurity - worry: Not on file    Food insecurity - inability: Not on file   Cuff-Protect needs - medical: Not on file   UrduOtterology needs - non-medical: Not on file   Occupational History    Occupation: Housekeeping     Employer: [employer]: works night shift    Tobacco Use    Smoking status: Never Smoker    Smokeless tobacco: Never Used   Substance and Sexual Activity    Alcohol use: No    Drug use: No    Sexual activity: Yes     Partners: Male     Comment: tubal ligation, 2015   Other Topics Concern    Not on file   Social History Narrative    Not on file         Family History - reviewed:  Family History   Problem Relation Age of Onset    Thyroid Disease Mother     Diabetes Maternal Grandmother     Diabetes Maternal Grandfather     Thyroid Disease Sister          Immunizations - reviewed: There is no immunization history on file for this patient. Review of Systems  Review of Systems   Constitutional: Negative for chills and fever. Respiratory: Negative for shortness of breath. Cardiovascular: Negative for chest pain. Physical Exam    Visit Vitals  /67   Pulse 62   Temp 97.7 °F (36.5 °C) (Oral)   Resp 16   Ht 5' 3\" (1.6 m)   Wt 106 lb (48.1 kg)   SpO2 98%   BMI 18.78 kg/m²       Physical Exam   Constitutional: She is oriented to person, place, and time.  She appears well-developed and well-nourished. No distress. HENT:   Head: Normocephalic. Cardiovascular: Normal rate, regular rhythm, normal heart sounds and intact distal pulses. Exam reveals no gallop and no friction rub. No murmur heard. Pulmonary/Chest: Effort normal and breath sounds normal. No respiratory distress. She has no wheezes. She has no rales. She exhibits no tenderness. Musculoskeletal: Normal range of motion. Neurological: She is alert and oriented to person, place, and time. Skin: Skin is warm. She is not diaphoretic. Psychiatric: She has a normal mood and affect. Her behavior is normal. Judgment and thought content normal. Her mood appears not anxious. Cognition and memory are normal. She does not exhibit a depressed mood. She expresses no homicidal and no suicidal ideation. She expresses no suicidal plans and no homicidal plans. Nursing note and vitals reviewed. Assessment    ICD-10-CM ICD-9-CM    1. Moderate major depression (HCC) F32.1 296.22 sertraline (ZOLOFT) 50 mg tablet   2. Insomnia due to mental condition F51.05 300.9      327.02      Plan  1. Moderate major depression (HCC) - zoloft helping. Continue this for now. PHQ-9 score improved. Follow up with therapy as discussed at previous visit. Plan to see her back in 1 month--can make adjustments to zoloft at that time. - sertraline (ZOLOFT) 50 mg tablet; Take 1 Tab by mouth daily. Dispense: 30 Tab; Refill: 1    2. Insomnia due to mental condition - still with difficulty sleeping. Swing shifts make this worse. Melatonin not helping. I discussed sleep hygiene with her. No phone or TV 1 hour prior to bed. Only in bed for sleep. Stay active and exercise throughout waking hours. Continue melatonin. Follow-up Disposition:  Return in about 4 weeks (around 1/17/2019) for depression follow up. I have discussed the diagnosis with the patient and the intended plan as seen in the above orders.  Patient verbalized understanding of the plan and agrees with the plan. The patient has received an after-visit summary and questions were answered concerning future plans. I have discussed medication side effects and warnings with the patient as well. Informed patient to return to the office if new symptoms arise.     Patient was discussed with Dr. Larissa Coles MD  Family Medicine Resident

## 2018-12-20 NOTE — PATIENT INSTRUCTIONS

## 2018-12-20 NOTE — PROGRESS NOTES
Chief Complaint   Patient presents with    Back Pain     follow up     Blood pressure 103/67, pulse 62, temperature 97.7 °F (36.5 °C), temperature source Oral, resp. rate 16, height 5' 3\" (1.6 m), weight 106 lb (48.1 kg), last menstrual period 12/07/2018, SpO2 98 %. 1. Have you been to the ER, urgent care clinic since your last visit? Hospitalized since your last visit? No    2. Have you seen or consulted any other health care providers outside of the [address] [place] [address] since your last visit? Include any pap smears or colon screening.  No

## 2018-12-20 NOTE — PROGRESS NOTES
[address] Medicine Residency Attending Addendum:  Patient encounter was discussed on the day of the encounter with Maris Hilton MD, performing the key elements of the service. I discussed the findings, assessment and plan with the resident and agree with the resident's findings and plan as documented in the resident's note.       Dominik Gomez MD, CAQSM, RMSK

## 2019-01-17 ENCOUNTER — OFFICE VISIT (OUTPATIENT)
Dept: FAMILY MEDICINE CLINIC | Age: 29
End: 2019-01-17

## 2019-01-17 VITALS
SYSTOLIC BLOOD PRESSURE: 105 MMHG | OXYGEN SATURATION: 99 % | HEART RATE: 64 BPM | RESPIRATION RATE: 16 BRPM | WEIGHT: 101 LBS | TEMPERATURE: 97 F | BODY MASS INDEX: 17.89 KG/M2 | DIASTOLIC BLOOD PRESSURE: 67 MMHG | HEIGHT: 63 IN

## 2019-01-17 DIAGNOSIS — N92.0 MENORRHAGIA WITH REGULAR CYCLE: ICD-10-CM

## 2019-01-17 DIAGNOSIS — F32.1 MODERATE MAJOR DEPRESSION (HCC): Primary | ICD-10-CM

## 2019-01-17 DIAGNOSIS — D64.9 ANEMIA, UNSPECIFIED TYPE: ICD-10-CM

## 2019-01-17 RX ORDER — SERTRALINE HYDROCHLORIDE 50 MG/1
50 TABLET, FILM COATED ORAL DAILY
Qty: 90 TAB | Refills: 1 | Status: SHIPPED | OUTPATIENT
Start: 2019-01-17 | End: 2019-08-04 | Stop reason: SDUPTHER

## 2019-01-17 NOTE — PATIENT INSTRUCTIONS
Heavy Menstrual Periods: Care Instructions  Your Care Instructions    Many women get heavy menstrual periods and painful cramps. For some women, this means passing large blood clots and changing sanitary pads or tampons often. You may also have periods that last longer than 7 days. A change in hormones or an irritation in the uterus can cause heavy bleeding. Women who are overweight are more likely to have heavy menstrual periods. But there may not be a specific cause for your heavy menstrual periods. Your doctor may recommend hormone treatments to slow or stop your periods. If a fibroid (a growth that is not cancer) is causing your heavy bleeding, your doctor may recommend surgery or other treatments to remove the growth. Because blood loss from heavy menstrual periods can make you very tired and weak (anemic), your doctor may recommend that you take extra iron. Follow-up care is a key part of your treatment and safety. Be sure to make and go to all appointments, and call your doctor if you are having problems. It's also a good idea to know your test results and keep a list of the medicines you take. How can you care for yourself at home? · Get plenty of rest.  · Keep a record of your periods. Write down when your period begins and ends and how much flow you have. That means counting the number of pads and tampons you use. Note whether they are soaked. Note any other symptoms. Take this record to your doctor appointments. · Take your medicines exactly as prescribed. Call your doctor if you think you are having a problem with your medicine. · Take pain medicines exactly as directed. ? If the doctor gave you a prescription medicine for pain, take it as prescribed. ? If you are not taking a prescription pain medicine, ask your doctor if you can take an over-the-counter medicine. · Try to reach a healthy weight. If you are trying to lose weight, do it slowly with your doctor's advice.   · If you are taking iron pills:  ? Try to take the pills about 1 hour before or 2 hours after meals. But you may need to take iron with some food to avoid an upset stomach. ? Vitamin C (from food or pills) helps your body absorb iron. Try taking iron pills with a glass of orange juice or other citrus fruit juice. ? Do not take antacids or drink milk or caffeine drinks (such as coffee, tea, or cola) at the same time or within 2 hours of the time that you take your iron. They can make it hard for your body to absorb the iron. ? Iron pills may cause stomach problems, such as heartburn, nausea, diarrhea, constipation, and cramps. Be sure to drink plenty of fluids, and include fruits, vegetables, and fiber in your diet each day. ? If you forget to take an iron pill, do not take a double dose of iron the next time you take a pill. ? Keep iron pills out of the reach of small children. An overdose of iron can be very dangerous. When should you call for help? Call 911 anytime you think you may need emergency care. For example, call if:    · You passed out (lost consciousness).    Call your doctor now or seek immediate medical care if:    · You have new or worse belly or pelvic pain.     · You have severe vaginal bleeding.     · You feel dizzy or lightheaded, or you feel like you may faint.    Watch closely for changes in your health, and be sure to contact your doctor if:    · You think you may be pregnant.     · Your bleeding gets worse.     · You do not get better as expected. Where can you learn more? Go to http://hayes-daryl.info/. Enter F477 in the search box to learn more about \"Heavy Menstrual Periods: Care Instructions. \"  Current as of: May 15, 2018  Content Version: 11.8  © 3614-7201 [employer]. Care instructions adapted under license by [employer] (which disclaims liability or warranty for this information).  If you have questions about a medical condition or this instruction, always ask your healthcare professional. [place] [address] any warranty or liability for your use of this information.

## 2019-01-17 NOTE — PROGRESS NOTES
47 [facility] with [address] [facility]     Chief Complaint: \"depression follow up. \"    Subjective  Bakari Ulloa is an 25 y.o. female who presents for depression follow up. Started on zoloft ~6 weeks ago. Describes multiple stressors at home--family. Thinks that things are improving. States that she is feeling better. States that she has gotten in with a [employer]. Has done one session and is going back for another session on 1/19. Thinks that current dose of zoloft is working well. Does not want to increase dose. PHQ over the last two weeks 1/17/2019   Little interest or pleasure in doing things Several days   Feeling down, depressed, irritable, or hopeless Several days   Total Score PHQ 2 2   Trouble falling or staying asleep, or sleeping too much Several days   Feeling tired or having little energy Several days   Poor appetite, weight loss, or overeating Several days   Feeling bad about yourself - or that you are a failure or have let yourself or your family down Not at all   Trouble concentrating on things such as school, work, reading, or watching TV Not at all   Moving or speaking so slowly that other people could have noticed; or the opposite being so fidgety that others notice Several days   Thoughts of being better off dead, or hurting yourself in some way Not at all   PHQ 9 Score 6     NIKKI 7 1/17/2019   Over the past 2 weeks how often have you felt nervous, anxious, or on edge? 1   Over the past 2 weeks how often have you not been able to stop or control worrying? 2   In the past 2 weeks how often have you worried too much about different things? 2   Over the past 2 weeks, how often have you had trouble relaxing? 1   Over the last 2 weeks how often have you been so restless that it is hard to sit still? 0   Over the last 2 weeks how often have you been easily annoyed or irritable?  1   Over the last 2 weeks, how often have you felt afraid as if something awful might happen? 1   BSHSI AMB NIKKI-7 SCORE 8     She is also asking about anemia. Notes that her blood counts were a little low when checked at 9/2018 visit. When asked about menstrual preiods--states that they have been lasting longer (more bleeding) recently. This is a new problem and has not been evaluated previously. There is no increased pain or cramping with periods. States that she is currently on the 8th day of current period. In the past periods were 4-5 days. Not on any hormonal birth control. Had BTL in 2015. Allergies - reviewed:   No Known Allergies    Medications - reviewed:   Current Outpatient Medications   Medication Sig    sertraline (ZOLOFT) 50 mg tablet Take 1 Tab by mouth daily.  fluconazole (DIFLUCAN) 150 mg tablet Take 2 tabs by mouth once weekly for 2 weeks     No current facility-administered medications for this visit.         I have reviewed and updated the histories as listed below:    Past Medical History - reviewed:  Past Medical History:   Diagnosis Date    Scoliosis, unspecified     Patient told she has an abnormal curve to her lower back, undergoes physical therapy for lower back pain         Past Surgical History - reviewed:   Past Surgical History:   Procedure Laterality Date    HX HERNIA REPAIR  2015    HX TUBAL LIGATION  2015         Social History - reviewed:  Social History     Socioeconomic History    Marital status: SINGLE     Spouse name: Not on file    Number of children: 3    Years of education: Not on file    Highest education level: Not on file   Social Needs    Financial resource strain: Not on file    Food insecurity - worry: Not on file    Food insecurity - inability: Not on file   [employer] needs - medical: Not on file   [employer] needs - non-medical: Not on file   Occupational History    Occupation: Housekeeping     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    Tobacco Use    Smoking status: Never Smoker    Smokeless tobacco: Never Used   Substance and Sexual Activity    Alcohol use: No    Drug use: No    Sexual activity: Yes     Partners: Male     Comment: tubal ligation, 2015   Other Topics Concern    Not on file   Social History Narrative    Not on file         Family History - reviewed:  Family History   Problem Relation Age of Onset    Thyroid Disease Mother     Diabetes Maternal Grandmother     Diabetes Maternal Grandfather     Thyroid Disease Sister          Immunizations - reviewed: There is no immunization history on file for this patient. Review of Systems  Review of Systems   Constitutional: Negative for chills and fever. HENT: Negative for congestion. Respiratory: Negative for shortness of breath. Cardiovascular: Negative for chest pain. Gastrointestinal: Negative for abdominal pain, constipation, diarrhea, nausea and vomiting. Genitourinary: Positive for menstrual problem (longer periods). Negative for dyspareunia and pelvic pain. Neurological: Negative for light-headedness and headaches. Psychiatric/Behavioral: Positive for dysphoric mood and sleep disturbance. Negative for self-injury and suicidal ideas. Physical Exam    Visit Vitals  /67   Pulse 64   Temp 97 °F (36.1 °C) (Oral)   Resp 16   Ht 5' 3\" (1.6 m)   Wt 101 lb (45.8 kg)   SpO2 99%   BMI 17.89 kg/m²       Physical Exam   Constitutional: She is oriented to person, place, and time. She appears well-developed and well-nourished. No distress. HENT:   Head: Normocephalic and atraumatic. Right Ear: External ear normal.   Left Ear: External ear normal.   Mouth/Throat: Oropharynx is clear and moist. No oropharyngeal exudate. Eyes: Conjunctivae are normal. Pupils are equal, round, and reactive to light. Right eye exhibits no discharge. Left eye exhibits no discharge. Neck: Normal range of motion. No thyromegaly present.    Cardiovascular: Normal rate, regular rhythm, normal heart sounds and intact distal pulses. Exam reveals no gallop and no friction rub. No murmur heard. Pulmonary/Chest: Effort normal and breath sounds normal. No respiratory distress. She has no wheezes. She has no rales. She exhibits no tenderness. Musculoskeletal: Normal range of motion. She exhibits no edema. Lymphadenopathy:     She has no cervical adenopathy. Neurological: She is alert and oriented to person, place, and time. No cranial nerve deficit. Skin: Skin is warm and dry. She is not diaphoretic. Psychiatric: Her speech is normal and behavior is normal. Judgment and thought content normal. Cognition and memory are normal. She exhibits a depressed mood (mild). She expresses no homicidal and no suicidal ideation. She expresses no suicidal plans and no homicidal plans. Nursing note and vitals reviewed. Personal review of labs ordered by Dr. Jamey Neves from 9/2018. Hgb 11.5. No further anemia work up done. No available baseline for comparison. Assessment    ICD-10-CM ICD-9-CM    1. Moderate major depression (HCC) F32.1 296.22 sertraline (ZOLOFT) 50 mg tablet   2. Anemia, unspecified type D64.9 285.9 CBC W/O DIFF      IRON PROFILE      FERRITIN      VITAMIN B12      FOLATE   3. Menorrhagia with regular cycle N92.0 626.2      Plan  1. Moderate major depression ([address] - continue zoloft. Helping some. In with therapy. - sertraline (ZOLOFT) 50 mg tablet; Take 1 Tab by mouth daily. Dispense: 90 Tab; Refill: 1    2. Anemia, unspecified type - noted on 9/2018 lab draw. Could be related to menorrhagia. Will check labs today (including repeat CBC). - CBC W/O DIFF  - IRON PROFILE  - FERRITIN  - VITAMIN B12  - FOLATE    3. Menorrhagia with regular cycle - I had a long discussion with the patient about this. BTL does not necessarily help with this issue. Can consider hormonal options in the future (mirena, nexplanon). Will discuss this at future visit.     Follow-up Disposition:  Return in about 2 days (around 1/19/2019) for f/u depression, menorrhagia. I have discussed the diagnosis with the patient and the intended plan as seen in the above orders. Patient verbalized understanding of the plan and agrees with the plan. The patient has received an after-visit summary and questions were answered concerning future plans. I have discussed medication side effects and warnings with the patient as well. Informed patient to return to the office if new symptoms arise. Patient was discussed with Dr. Danika Galarza.     Vern Shi MD  Family Medicine Resident

## 2019-01-18 LAB
ERYTHROCYTE [DISTWIDTH] IN BLOOD BY AUTOMATED COUNT: 12.5 % (ref 12.3–15.4)
FERRITIN SERPL-MCNC: 47 NG/ML (ref 15–150)
FOLATE SERPL-MCNC: >20 NG/ML
HCT VFR BLD AUTO: 35 % (ref 34–46.6)
HGB BLD-MCNC: 11.8 G/DL (ref 11.1–15.9)
IRON SATN MFR SERPL: 17 % (ref 15–55)
IRON SERPL-MCNC: 52 UG/DL (ref 27–159)
MCH RBC QN AUTO: 30.6 PG (ref 26.6–33)
MCHC RBC AUTO-ENTMCNC: 33.7 G/DL (ref 31.5–35.7)
MCV RBC AUTO: 91 FL (ref 79–97)
PLATELET # BLD AUTO: 312 X10E3/UL (ref 150–379)
RBC # BLD AUTO: 3.85 X10E6/UL (ref 3.77–5.28)
TIBC SERPL-MCNC: 314 UG/DL (ref 250–450)
UIBC SERPL-MCNC: 262 UG/DL (ref 131–425)
VIT B12 SERPL-MCNC: 1301 PG/ML (ref 232–1245)
WBC # BLD AUTO: 5.1 X10E3/UL (ref 3.4–10.8)

## 2019-01-19 NOTE — PROGRESS NOTES
Result reviewed. Hgb still on the lower side. Normal iron studies. Could be from chronic blood loss as previously discussed.

## 2019-02-12 ENCOUNTER — OFFICE VISIT (OUTPATIENT)
Dept: FAMILY MEDICINE CLINIC | Age: 29
End: 2019-02-12

## 2019-02-12 VITALS
SYSTOLIC BLOOD PRESSURE: 101 MMHG | RESPIRATION RATE: 16 BRPM | HEART RATE: 66 BPM | TEMPERATURE: 98.2 F | WEIGHT: 108 LBS | OXYGEN SATURATION: 99 % | HEIGHT: 63 IN | BODY MASS INDEX: 19.14 KG/M2 | DIASTOLIC BLOOD PRESSURE: 56 MMHG

## 2019-02-12 DIAGNOSIS — F32.1 MODERATE MAJOR DEPRESSION (HCC): ICD-10-CM

## 2019-02-12 DIAGNOSIS — D64.9 HEMOGLOBIN LOW: ICD-10-CM

## 2019-02-12 DIAGNOSIS — N92.0 MENORRHAGIA WITH REGULAR CYCLE: Primary | ICD-10-CM

## 2019-02-12 NOTE — PROGRESS NOTES
47 [facility] with [address] [facility]     Chief Complaint: \"I am worried about low blood counts. \"    Subjective  Beltran Zohaib is an 25 y.o. female who presents for lab follow up. Had labs done at last visit and hemoglobin noted to be on low end or normal.  States that she has been more fatigued recently. There is no acute blood loss. No blood in stool. Says that periods usually last 7 days and have held this duration for her entire life. I also treat her for depression. On zoloft. Says that it is helping. Allergies - reviewed:   No Known Allergies    Medications - reviewed:   Current Outpatient Medications   Medication Sig    sertraline (ZOLOFT) 50 mg tablet Take 1 Tab by mouth daily.  fluconazole (DIFLUCAN) 150 mg tablet Take 2 tabs by mouth once weekly for 2 weeks     No current facility-administered medications for this visit.         I have reviewed and updated the histories as listed below:    Past Medical History - reviewed:  Past Medical History:   Diagnosis Date    Scoliosis, unspecified     Patient told she has an abnormal curve to her lower back, undergoes physical therapy for lower back pain         Past Surgical History - reviewed:   Past Surgical History:   Procedure Laterality Date    HX HERNIA REPAIR  2015    HX TUBAL LIGATION  2015         Social History - reviewed:  Social History     Socioeconomic History    Marital status: SINGLE     Spouse name: Not on file    Number of children: 3    Years of education: Not on file    Highest education level: Not on file   Social Needs    Financial resource strain: Not on file    Food insecurity - worry: Not on file    Food insecurity - inability: Not on file   [employer] needs - medical: Not on file   [employer] needs - non-medical: Not on file   Occupational History    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    Tobacco Use    Smoking status: Never Smoker    Smokeless tobacco: Never Used   Substance and Sexual Activity    Alcohol use: No    Drug use: No    Sexual activity: Yes     Partners: Male     Comment: tubal ligation, 2015   Other Topics Concern    Not on file   Social History Narrative    Not on file         Family History - reviewed:  Family History   Problem Relation Age of Onset    Thyroid Disease Mother     Diabetes Maternal Grandmother     Diabetes Maternal Grandfather     Thyroid Disease Sister          Immunizations - reviewed: There is no immunization history on file for this patient. Review of Systems  Review of Systems   Constitutional: Negative for chills and fever. Respiratory: Negative for shortness of breath. Cardiovascular: Negative for chest pain. Physical Exam    Visit Vitals  /56   Pulse 66   Temp 98.2 °F (36.8 °C) (Oral)   Resp 16   Ht 5' 3\" (1.6 m)   Wt 108 lb (49 kg)   SpO2 99%   BMI 19.13 kg/m²       Physical Exam   Constitutional: She is oriented to person, place, and time. She appears well-developed and well-nourished. No distress. Eyes: Pupils are equal, round, and reactive to light. Neck: Normal range of motion. Pulmonary/Chest: Effort normal and breath sounds normal. No respiratory distress. She has no wheezes. She has no rales. She exhibits no tenderness. Abdominal: Soft. Bowel sounds are normal. She exhibits no distension and no mass. There is no tenderness. There is no rebound and no guarding. Musculoskeletal: Normal range of motion. Lymphadenopathy:     She has no cervical adenopathy. Neurological: She is alert and oriented to person, place, and time. No cranial nerve deficit. Skin: Skin is warm and dry. Psychiatric: She has a normal mood and affect. Nursing note and vitals reviewed. Assessment    ICD-10-CM ICD-9-CM    1. Menorrhagia with regular cycle N92.0 626.2    2. Hemoglobin low D64.9 285.9    3.  Moderate major depression (HCC) F32.1 296.22      Plan  1. Hemoglobin low - Hgb on low end of normal.  Will watch for now. Iron studies, B12, folate all normal.  Lower Hgb could be due to chronic menorrhagia. 2. Menorrhagia with regular cycle - s/p BTL. Recommended that she use ibuprofen during menstrual periods. Can explore other options (mirena, nexplanon) if remains unimproved. Not a candidate for combination oral OCPs as she has a history of migraines w/ aura. 3. Moderate major depression ([facility] [address] - treating with zoloft. Helping. Follow-up Disposition:  Return in about 4 months (around 6/12/2019) for depression follow up. I have discussed the diagnosis with the patient and the intended plan as seen in the above orders. Patient verbalized understanding of the plan and agrees with the plan. The patient has received an after-visit summary and questions were answered concerning future plans. I have discussed medication side effects and warnings with the patient as well. Informed patient to return to the office if new symptoms arise. Patient was discussed with Dr. Suraj Jackson.     Erica Fothergill, MD  Family Medicine Resident

## 2019-02-12 NOTE — PROGRESS NOTES
25year old female here for depression followup    S/p BTL    Currently on zoloft, working for her    Also has 7 day periods -- will try ibuprofen to help with flow    I reviewed with the resident the medical history and the resident's findings on the physical examination. I discussed with the resident the patient's diagnosis and concur with the plan.

## 2019-02-12 NOTE — PATIENT INSTRUCTIONS
Heavy Menstrual Periods: Care Instructions  Your Care Instructions    Many women get heavy menstrual periods and painful cramps. For some women, this means passing large blood clots and changing sanitary pads or tampons often. You may also have periods that last longer than 7 days. A change in hormones or an irritation in the uterus can cause heavy bleeding. Women who are overweight are more likely to have heavy menstrual periods. But there may not be a specific cause for your heavy menstrual periods. Your doctor may recommend hormone treatments to slow or stop your periods. If a fibroid (a growth that is not cancer) is causing your heavy bleeding, your doctor may recommend surgery or other treatments to remove the growth. Because blood loss from heavy menstrual periods can make you very tired and weak (anemic), your doctor may recommend that you take extra iron. Follow-up care is a key part of your treatment and safety. Be sure to make and go to all appointments, and call your doctor if you are having problems. It's also a good idea to know your test results and keep a list of the medicines you take. How can you care for yourself at home? · Get plenty of rest.  · Keep a record of your periods. Write down when your period begins and ends and how much flow you have. That means counting the number of pads and tampons you use. Note whether they are soaked. Note any other symptoms. Take this record to your doctor appointments. · Take your medicines exactly as prescribed. Call your doctor if you think you are having a problem with your medicine. · Take pain medicines exactly as directed. ? If the doctor gave you a prescription medicine for pain, take it as prescribed. ? If you are not taking a prescription pain medicine, ask your doctor if you can take an over-the-counter medicine. · Try to reach a healthy weight. If you are trying to lose weight, do it slowly with your doctor's advice.   · If you are taking iron pills:  ? Try to take the pills about 1 hour before or 2 hours after meals. But you may need to take iron with some food to avoid an upset stomach. ? Vitamin C (from food or pills) helps your body absorb iron. Try taking iron pills with a glass of orange juice or other citrus fruit juice. ? Do not take antacids or drink milk or caffeine drinks (such as coffee, tea, or cola) at the same time or within 2 hours of the time that you take your iron. They can make it hard for your body to absorb the iron. ? Iron pills may cause stomach problems, such as heartburn, nausea, diarrhea, constipation, and cramps. Be sure to drink plenty of fluids, and include fruits, vegetables, and fiber in your diet each day. ? If you forget to take an iron pill, do not take a double dose of iron the next time you take a pill. ? Keep iron pills out of the reach of small children. An overdose of iron can be very dangerous. When should you call for help? Call 911 anytime you think you may need emergency care. For example, call if:    · You passed out (lost consciousness).    Call your doctor now or seek immediate medical care if:    · You have new or worse belly or pelvic pain.     · You have severe vaginal bleeding.     · You feel dizzy or lightheaded, or you feel like you may faint.    Watch closely for changes in your health, and be sure to contact your doctor if:    · You think you may be pregnant.     · Your bleeding gets worse.     · You do not get better as expected. Where can you learn more? Go to http://hayes-daryl.info/. Enter F477 in the search box to learn more about \"Heavy Menstrual Periods: Care Instructions. \"  Current as of: May 14, 2018  Content Version: 11.9  © 9571-5737 [employer]. Care instructions adapted under license by [employer] (which disclaims liability or warranty for this information).  If you have questions about a medical condition or this instruction, always ask your healthcare professional. [place] [address] any warranty or liability for your use of this information.

## 2019-06-06 ENCOUNTER — HOSPITAL ENCOUNTER (EMERGENCY)
Age: 29
Discharge: HOME | End: 2019-06-06
Attending: EMERGENCY MEDICINE
Payer: COMMERCIAL

## 2019-06-06 ENCOUNTER — APPOINTMENT (OUTPATIENT)
Dept: GENERAL RADIOLOGY | Age: 29
End: 2019-06-06
Attending: EMERGENCY MEDICINE
Payer: COMMERCIAL

## 2019-06-06 VITALS
RESPIRATION RATE: 18 BRPM | TEMPERATURE: 98.8 F | WEIGHT: 107.58 LBS | BODY MASS INDEX: 19.06 KG/M2 | SYSTOLIC BLOOD PRESSURE: 114 MMHG | HEART RATE: 72 BPM | OXYGEN SATURATION: 100 % | DIASTOLIC BLOOD PRESSURE: 57 MMHG

## 2019-06-06 DIAGNOSIS — R51.9 NONINTRACTABLE HEADACHE, UNSPECIFIED CHRONICITY PATTERN, UNSPECIFIED HEADACHE TYPE: Primary | ICD-10-CM

## 2019-06-06 DIAGNOSIS — R00.2 PALPITATIONS: ICD-10-CM

## 2019-06-06 LAB
ANION GAP SERPL CALC-SCNC: 11 MMOL/L (ref 5–15)
BASOPHILS # BLD: 0 K/UL (ref 0–0.1)
BASOPHILS NFR BLD: 1 % (ref 0–1)
BUN SERPL-MCNC: 13 MG/DL (ref 6–20)
BUN/CREAT SERPL: 16 (ref 12–20)
CALCIUM SERPL-MCNC: 8.8 MG/DL (ref 8.5–10.1)
CHLORIDE SERPL-SCNC: 104 MMOL/L (ref 97–108)
CO2 SERPL-SCNC: 26 MMOL/L (ref 21–32)
CREAT SERPL-MCNC: 0.8 MG/DL (ref 0.55–1.02)
D DIMER PPP FEU-MCNC: 0.23 MG/L FEU (ref 0–0.65)
DIFFERENTIAL METHOD BLD: ABNORMAL
EOSINOPHIL # BLD: 0.1 K/UL (ref 0–0.4)
EOSINOPHIL NFR BLD: 2 % (ref 0–7)
ERYTHROCYTE [DISTWIDTH] IN BLOOD BY AUTOMATED COUNT: 11.3 % (ref 11.5–14.5)
GLUCOSE SERPL-MCNC: 104 MG/DL (ref 65–100)
HCG UR QL: NEGATIVE
HCT VFR BLD AUTO: 35.4 % (ref 35–47)
HGB BLD-MCNC: 12.3 G/DL (ref 11.5–16)
LYMPHOCYTES # BLD: 2.5 K/UL (ref 0.8–3.5)
LYMPHOCYTES NFR BLD: 33 % (ref 12–49)
MAGNESIUM SERPL-MCNC: 1.9 MG/DL (ref 1.6–2.4)
MCH RBC QN AUTO: 31.6 PG (ref 26–34)
MCHC RBC AUTO-ENTMCNC: 34.7 G/DL (ref 30–36.5)
MCV RBC AUTO: 91 FL (ref 80–99)
MONOCYTES # BLD: 0.7 K/UL (ref 0–1)
MONOCYTES NFR BLD: 9 % (ref 5–13)
NEUTS SEG # BLD: 4.4 K/UL (ref 1.8–8)
NEUTS SEG NFR BLD: 55 % (ref 32–75)
PLATELET # BLD AUTO: 312 K/UL (ref 150–400)
PMV BLD AUTO: 9.8 FL (ref 8.9–12.9)
POTASSIUM SERPL-SCNC: 3.1 MMOL/L (ref 3.5–5.1)
RBC # BLD AUTO: 3.89 M/UL (ref 3.8–5.2)
SODIUM SERPL-SCNC: 141 MMOL/L (ref 136–145)
WBC # BLD AUTO: 7.8 K/UL (ref 3.6–11)

## 2019-06-06 PROCEDURE — 80048 BASIC METABOLIC PNL TOTAL CA: CPT

## 2019-06-06 PROCEDURE — 96361 HYDRATE IV INFUSION ADD-ON: CPT

## 2019-06-06 PROCEDURE — 99285 EMERGENCY DEPT VISIT HI MDM: CPT

## 2019-06-06 PROCEDURE — 83735 ASSAY OF MAGNESIUM: CPT

## 2019-06-06 PROCEDURE — 36415 COLL VENOUS BLD VENIPUNCTURE: CPT

## 2019-06-06 PROCEDURE — 96375 TX/PRO/DX INJ NEW DRUG ADDON: CPT

## 2019-06-06 PROCEDURE — 74011250637 HC RX REV CODE- 250/637: Performed by: EMERGENCY MEDICINE

## 2019-06-06 PROCEDURE — 71046 X-RAY EXAM CHEST 2 VIEWS: CPT

## 2019-06-06 PROCEDURE — 85379 FIBRIN DEGRADATION QUANT: CPT

## 2019-06-06 PROCEDURE — 96374 THER/PROPH/DIAG INJ IV PUSH: CPT

## 2019-06-06 PROCEDURE — 93005 ELECTROCARDIOGRAM TRACING: CPT

## 2019-06-06 PROCEDURE — 85025 COMPLETE CBC W/AUTO DIFF WBC: CPT

## 2019-06-06 PROCEDURE — 81025 URINE PREGNANCY TEST: CPT

## 2019-06-06 PROCEDURE — 74011250636 HC RX REV CODE- 250/636: Performed by: EMERGENCY MEDICINE

## 2019-06-06 RX ORDER — KETOROLAC TROMETHAMINE 30 MG/ML
30 INJECTION, SOLUTION INTRAMUSCULAR; INTRAVENOUS
Status: COMPLETED | OUTPATIENT
Start: 2019-06-06 | End: 2019-06-06

## 2019-06-06 RX ORDER — POTASSIUM CHLORIDE 750 MG/1
40 TABLET, FILM COATED, EXTENDED RELEASE ORAL
Status: COMPLETED | OUTPATIENT
Start: 2019-06-06 | End: 2019-06-06

## 2019-06-06 RX ORDER — DEXAMETHASONE SODIUM PHOSPHATE 10 MG/ML
10 INJECTION INTRAMUSCULAR; INTRAVENOUS ONCE
Status: COMPLETED | OUTPATIENT
Start: 2019-06-06 | End: 2019-06-06

## 2019-06-06 RX ORDER — BUTALBITAL, ACETAMINOPHEN AND CAFFEINE 300; 40; 50 MG/1; MG/1; MG/1
1 CAPSULE ORAL
Qty: 12 CAP | Refills: 0 | Status: SHIPPED | OUTPATIENT
Start: 2019-06-06

## 2019-06-06 RX ADMIN — KETOROLAC TROMETHAMINE 30 MG: 30 INJECTION, SOLUTION INTRAMUSCULAR at 16:50

## 2019-06-06 RX ADMIN — SODIUM CHLORIDE 1000 ML: 900 INJECTION, SOLUTION INTRAVENOUS at 16:49

## 2019-06-06 RX ADMIN — DEXAMETHASONE SODIUM PHOSPHATE 10 MG: 10 INJECTION, SOLUTION INTRAMUSCULAR; INTRAVENOUS at 16:51

## 2019-06-06 RX ADMIN — POTASSIUM CHLORIDE 40 MEQ: 750 TABLET, FILM COATED, EXTENDED RELEASE ORAL at 18:12

## 2019-06-06 NOTE — DISCHARGE INSTRUCTIONS
Patient Education        Headache: Care Instructions  Your Care Instructions    Headaches have many possible causes. Most headaches aren't a sign of a more serious problem, and they will get better on their own. Home treatment may help you feel better faster. The doctor has checked you carefully, but problems can develop later. If you notice any problems or new symptoms, get medical treatment right away. Follow-up care is a key part of your treatment and safety. Be sure to make and go to all appointments, and call your doctor if you are having problems. It's also a good idea to know your test results and keep a list of the medicines you take. How can you care for yourself at home? · Do not drive if you have taken a prescription pain medicine. · Rest in a quiet, dark room until your headache is gone. Close your eyes and try to relax or go to sleep. Don't watch TV or read. · Put a cold, moist cloth or cold pack on the painful area for 10 to 20 minutes at a time. Put a thin cloth between the cold pack and your skin. · Use a warm, moist towel or a heating pad set on low to relax tight shoulder and neck muscles. · Have someone gently massage your neck and shoulders. · Take pain medicines exactly as directed. ? If the doctor gave you a prescription medicine for pain, take it as prescribed. ? If you are not taking a prescription pain medicine, ask your doctor if you can take an over-the-counter medicine. · Be careful not to take pain medicine more often than the instructions allow, because you may get worse or more frequent headaches when the medicine wears off. · Do not ignore new symptoms that occur with a headache, such as a fever, weakness or numbness, vision changes, or confusion. These may be signs of a more serious problem. To prevent headaches  · Keep a headache diary so you can figure out what triggers your headaches. Avoiding triggers may help you prevent headaches.  Record when each headache began, how long it lasted, and what the pain was like (throbbing, aching, stabbing, or dull). Write down any other symptoms you had with the headache, such as nausea, flashing lights or dark spots, or sensitivity to bright light or loud noise. Note if the headache occurred near your period. List anything that might have triggered the headache, such as certain foods (chocolate, cheese, wine) or odors, smoke, bright light, stress, or lack of sleep. · Find healthy ways to deal with stress. Headaches are most common during or right after stressful times. Take time to relax before and after you do something that has caused a headache in the past.  · Try to keep your muscles relaxed by keeping good posture. Check your jaw, face, neck, and shoulder muscles for tension, and try relaxing them. When sitting at a desk, change positions often, and stretch for 30 seconds each hour. · Get plenty of sleep and exercise. · Eat regularly and well. Long periods without food can trigger a headache. · Treat yourself to a massage. Some people find that regular massages are very helpful in relieving tension. · Limit caffeine by not drinking too much coffee, tea, or soda. But don't quit caffeine suddenly, because that can also give you headaches. · Reduce eyestrain from computers by blinking frequently and looking away from the computer screen every so often. Make sure you have proper eyewear and that your monitor is set up properly, about an arm's length away. · Seek help if you have depression or anxiety. Your headaches may be linked to these conditions. Treatment can both prevent headaches and help with symptoms of anxiety or depression. When should you call for help? Call 911 anytime you think you may need emergency care. For example, call if:    · You have signs of a stroke. These may include:  ? Sudden numbness, paralysis, or weakness in your face, arm, or leg, especially on only one side of your body.   ? Sudden vision changes. ? Sudden trouble speaking. ? Sudden confusion or trouble understanding simple statements. ? Sudden problems with walking or balance. ? A sudden, severe headache that is different from past headaches.    Call your doctor now or seek immediate medical care if:    · You have a new or worse headache.     · Your headache gets much worse. Where can you learn more? Go to http://hayes-daryl.info/. Enter M271 in the search box to learn more about \"Headache: Care Instructions. \"  Current as of: Corrie 3, 2018  Content Version: 11.9  © 2357-1906 [employer]. Care instructions adapted under license by [employer] (which disclaims liability or warranty for this information). If you have questions about a medical condition or this instruction, always ask your healthcare professional. [place] [address] any warranty or liability for your use of this information. Patient Education        Palpitations: Care Instructions  Your Care Instructions    Heart palpitations are the uncomfortable sensation that your heart is beating fast or irregularly. You might feel pounding or fluttering in your chest. It might feel like your heart is skipping a beat. Although palpitations may be caused by a heart problem, they also occur because of stress, fatigue, or use of alcohol, caffeine, or nicotine. Many medicines, including diet pills, antihistamines, decongestants, and some herbal products, can cause heart palpitations. Nearly everyone has palpitations from time to time. Depending on your symptoms, your doctor may need to do more tests to try to find the cause of your palpitations. Follow-up care is a key part of your treatment and safety. Be sure to make and go to all appointments, and call your doctor if you are having problems. It's also a good idea to know your test results and keep a list of the medicines you take.   How can you care for yourself at home?  · Avoid caffeine, nicotine, and excess alcohol. · Do not take illegal drugs, such as methamphetamines and cocaine. · Do not take weight loss or diet medicines unless you talk with your doctor first.  · Get plenty of sleep. · Do not overeat. · If you have palpitations again, take deep breaths and try to relax. This may slow a racing heart. · If you start to feel lightheaded, lie down to avoid injuries that might result if you pass out and fall down. · Keep a record of your palpitations and bring it to your next doctor's appointment. Write down:  ? The date and time. ? Your pulse. (If your heart is beating fast, it may be hard to count your pulse.)  ? What you were doing when the palpitations started. ? How long the palpitations lasted. ? Any other symptoms. · If an activity causes palpitations, slow down or stop. Talk to your doctor before you do that activity again. · Take your medicines exactly as prescribed. Call your doctor if you think you are having a problem with your medicine. When should you call for help? Call 911 anytime you think you may need emergency care. For example, call if:    · You passed out (lost consciousness).     · You have symptoms of a heart attack. These may include:  ? Chest pain or pressure, or a strange feeling in the chest.  ? Sweating. ? Shortness of breath. ? Pain, pressure, or a strange feeling in the back, neck, jaw, or upper belly or in one or both shoulders or arms. ? Lightheadedness or sudden weakness. ? A fast or irregular heartbeat. After you call 911, the  may tell you to chew 1 adult-strength or 2 to 4 low-dose aspirin. Wait for an ambulance. Do not try to drive yourself.     · You have symptoms of a stroke. These may include:  ? Sudden numbness, tingling, weakness, or loss of movement in your face, arm, or leg, especially on only one side of your body. ? Sudden vision changes. ? Sudden trouble speaking.   ? Sudden confusion or trouble understanding simple statements. ? Sudden problems with walking or balance. ? A sudden, severe headache that is different from past headaches.    Call your doctor now or seek immediate medical care if:    · You have heart palpitations and:  ? Are dizzy or lightheaded, or you feel like you may faint. ? Have new or increased shortness of breath.    Watch closely for changes in your health, and be sure to contact your doctor if:    · You continue to have heart palpitations. Where can you learn more? Go to http://hayes-daryl.info/. Enter R508 in the search box to learn more about \"Palpitations: Care Instructions. \"  Current as of: July 22, 2018  Content Version: 11.9  © 9688-5617 [employer]. Care instructions adapted under license by [employer] (which disclaims liability or warranty for this information). If you have questions about a medical condition or this instruction, always ask your healthcare professional. [address] any warranty or liability for your use of this information.

## 2019-06-06 NOTE — ED NOTES
The patient was discharged home by [facility], RN in stable condition. The patient is alert and oriented, in no respiratory distress and discharge vital signs obtained. The patient's diagnosis, condition and treatment were explained. The patient expressed understanding. prescriptions given. No work/school note given. A discharge plan has been developed. A  was not involved in the process. Aftercare instructions were given. Pt ambulatory out of the ED.

## 2019-06-06 NOTE — ED PROVIDER NOTES
HPI     Pt is a 25 y.o. F here with c/o headache and palpitations x 1 week. Headache started last night. Frontal headache and parietal and neck pain similar to previous headaches. It normally goes away with advil. She has taken a medication called Myrna-Neurobion from [facility] that her mom brought her. She has not been out of the country. She does have intermittent visual changes. No N/V. She feels fatigued. Has dyspnea and chest pain occasionally with palpitations. No other complaints at this time.      Past Medical History:   Diagnosis Date    Neurological disorder     headaches    Scoliosis, unspecified     Patient told she has an abnormal curve to her lower back, undergoes physical therapy for lower back pain       Past Surgical History:   Procedure Laterality Date    HX HERNIA REPAIR  2015    HX TUBAL LIGATION  2015         Family History:   Problem Relation Age of Onset    Thyroid Disease Mother     Diabetes Maternal Grandmother     Diabetes Maternal Grandfather     Thyroid Disease Sister        Social History     Socioeconomic History    Marital status: SINGLE     Spouse name: Not on file    Number of children: 3    Years of education: Not on file    Highest education level: Not on file   Occupational History    Occupation: Housekeeping     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    Social Needs    Financial resource strain: Not on file    Food insecurity:     Worry: Not on file     Inability: Not on file    Transportation needs:     Medical: Not on file     Non-medical: Not on file   Tobacco Use    Smoking status: Never Smoker    Smokeless tobacco: Never Used   Substance and Sexual Activity    Alcohol use: No    Drug use: No    Sexual activity: Yes     Partners: Male     Comment: tubal ligation, 2015   Lifestyle    Physical activity:     Days per week: Not on file     Minutes per session: Not on file    Stress: Not on file   Relationships    Social connections:     Talks on phone: Not on file     Gets together: Not on file     Attends Jewish service: Not on file     Active member of club or organization: Not on file     Attends meetings of clubs or organizations: Not on file     Relationship status: Not on file    Intimate partner violence:     Fear of current or ex partner: Not on file     Emotionally abused: Not on file     Physically abused: Not on file     Forced sexual activity: Not on file   Other Topics Concern    Not on file   Social History Narrative    Not on file         ALLERGIES: Patient has no known allergies. Review of Systems   Respiratory: Positive for shortness of breath. Negative for chest tightness. Cardiovascular: Positive for palpitations. Neurological: Positive for headaches. Vitals:    06/06/19 1555   BP: 126/59   Pulse: 79   Resp: 20   Temp: 98.8 °F (37.1 °C)   SpO2: 100%   Weight: 48.8 kg (107 lb 9.4 oz)            Physical Exam   Constitutional: She is oriented to person, place, and time. She appears well-developed and well-nourished. No distress. HENT:   Head: Normocephalic. Mouth/Throat: Oropharynx is clear and moist.   Eyes: Pupils are equal, round, and reactive to light. Conjunctivae and EOM are normal.   Neck: Normal range of motion. Neck supple. No JVD present. Cardiovascular: Normal rate, regular rhythm, normal heart sounds and intact distal pulses. Pulmonary/Chest: Effort normal and breath sounds normal.   Abdominal: Soft. Bowel sounds are normal. She exhibits no distension. There is no tenderness. Musculoskeletal: Normal range of motion. She exhibits no edema, tenderness or deformity. Lymphadenopathy:     She has no cervical adenopathy. Neurological: She is alert and oriented to person, place, and time. No cranial nerve deficit or sensory deficit. Skin: Skin is warm and dry. Capillary refill takes less than 2 seconds. No rash noted. She is not diaphoretic. No erythema.    Psychiatric: She has a normal mood and affect. Nursing note and vitals reviewed. MDM       Procedures    ED EKG interpretation:  Rhythm: sinus bradycardia; and regular . Rate (approx.): 55; Axis: normal; QRS interval: normal ; ST/T wave: normal; Other findings: low voltage QRS; QT/QTc  [id number]  EKG documented by Jerryl Siemens, MD, scribe, as interpreted by Delia Ely MD, ED MD.      6:08 PM re-eval  Pt feels better. Headache resolved but still having neck pain. No palpitations currently. EKG without ischemia or arrhythmia but she does have prolonged QT. BMP normal accept potassium which is replaced in ED.      6:13 PM  Patient's results have been reviewed with them. Patient and/or family have verbally conveyed their understanding and agreement of the patient's signs, symptoms, diagnosis, treatment and prognosis and additionally agree to follow up as recommended or return to the Emergency Room should their condition change prior to follow-up. Discharge instructions have also been provided to the patient with some educational information regarding their diagnosis as well a list of reasons why they would want to return to the ER prior to their follow-up appointment should their condition change.     Jerryl Siemens, MD

## 2019-06-07 LAB
ATRIAL RATE: 55 BPM
CALCULATED P AXIS, ECG09: 7 DEGREES
CALCULATED R AXIS, ECG10: 45 DEGREES
CALCULATED T AXIS, ECG11: 26 DEGREES
DIAGNOSIS, 93000: NORMAL
P-R INTERVAL, ECG05: 108 MS
Q-T INTERVAL, ECG07: 420 MS
QRS DURATION, ECG06: 80 MS
QTC CALCULATION (BEZET), ECG08: 401 MS
VENTRICULAR RATE, ECG03: 55 BPM

## 2019-08-04 DIAGNOSIS — F32.1 MODERATE MAJOR DEPRESSION (HCC): ICD-10-CM

## 2019-08-05 RX ORDER — SERTRALINE HYDROCHLORIDE 50 MG/1
TABLET, FILM COATED ORAL
Qty: 90 TAB | Refills: 1 | Status: SHIPPED | OUTPATIENT
Start: 2019-08-05

## 2019-12-25 ENCOUNTER — HOSPITAL ENCOUNTER (EMERGENCY)
Age: 29
Discharge: HOME | End: 2019-12-25
Attending: EMERGENCY MEDICINE
Payer: COMMERCIAL

## 2019-12-25 ENCOUNTER — APPOINTMENT (OUTPATIENT)
Dept: GENERAL RADIOLOGY | Age: 29
End: 2019-12-25
Attending: EMERGENCY MEDICINE
Payer: COMMERCIAL

## 2019-12-25 VITALS
BODY MASS INDEX: 19.41 KG/M2 | OXYGEN SATURATION: 100 % | DIASTOLIC BLOOD PRESSURE: 70 MMHG | HEART RATE: 76 BPM | HEIGHT: 63 IN | WEIGHT: 109.57 LBS | RESPIRATION RATE: 16 BRPM | TEMPERATURE: 98.8 F | SYSTOLIC BLOOD PRESSURE: 115 MMHG

## 2019-12-25 DIAGNOSIS — S93.601A RIGHT FOOT SPRAIN, INITIAL ENCOUNTER: Primary | ICD-10-CM

## 2019-12-25 PROCEDURE — 99283 EMERGENCY DEPT VISIT LOW MDM: CPT

## 2019-12-25 PROCEDURE — 73630 X-RAY EXAM OF FOOT: CPT

## 2019-12-25 PROCEDURE — 74011250637 HC RX REV CODE- 250/637: Performed by: EMERGENCY MEDICINE

## 2019-12-25 RX ORDER — IBUPROFEN 600 MG/1
600 TABLET ORAL
Status: COMPLETED | OUTPATIENT
Start: 2019-12-25 | End: 2019-12-25

## 2019-12-25 RX ADMIN — IBUPROFEN 600 MG: 600 TABLET, FILM COATED ORAL at 18:01

## 2019-12-25 NOTE — ED TRIAGE NOTES
Pt presents to ED with c/o pain in right foot since last night. Pt was playing with her children and jumped and landed on foot . Pain in dorsal distal aspect of foot.

## 2019-12-25 NOTE — DISCHARGE INSTRUCTIONS
Patient Education        Foot Sprain: Care Instructions  Your Care Instructions    A foot sprain occurs when you stretch or tear the ligaments around your foot. Ligaments are the tough tissues that connect one bone to another. A sprain can happen when you run, fall, or hit your toe against something. Sprains often happen when you jump or change direction quickly. This may occur when you play basketball, soccer, or other sports. Most foot sprains will get better with treatment at home. Follow-up care is a key part of your treatment and safety. Be sure to make and go to all appointments, and call your doctor if you are having problems. It's also a good idea to know your test results and keep a list of the medicines you take. How can you care for yourself at home? · Walk or put weight on your sprained foot as long as it does not hurt. · If your doctor gave you a splint or immobilizer, wear it as directed. If you were given crutches, use them as directed. · For the first 2 days after your injury, avoid hot showers, hot tubs, or hot packs. They may increase swelling. · Put ice or a cold pack on your foot for 10 to 20 minutes at a time to stop swelling. Try this every 1 to 2 hours for 3 days (when you are awake) or until the swelling goes down. Put a thin cloth between the ice pack and your skin. Keep your splint dry. · After 2 or 3 days, if your swelling is gone, put a heating pad (set on low) or a warm cloth on your foot. Some doctors suggest that you go back and forth between hot and cold treatments. · Prop up your foot on a pillow when you ice it or anytime you sit or lie down. Try to keep it above the level of your heart. This will help reduce swelling. · Take pain medicines exactly as directed. ? If the doctor gave you a prescription medicine for pain, take it as prescribed. ? If you are not taking a prescription pain medicine, ask your doctor if you can take an over-the-counter medicine.   · Do any exercises that your doctor or physical therapist suggests. · Return to your usual exercise gradually as you feel better. When should you call for help? Call your doctor now or seek immediate medical care if:    · You have increased or severe pain.     · Your toes are cool or pale or change color.     · Your wrap or splint feels too tight.     · You have signs of a blood clot, such as:  ? Pain in your calf, back of the knee, thigh, or groin. ? Redness and swelling in your leg or groin.     · You have tingling, weakness, or numbness in your leg or foot.    Watch closely for changes in your health, and be sure to contact your doctor if:    · You cannot put any weight on your foot.     · You get a fever.     · You do not get better as expected. Where can you learn more? Go to http://hayes-daryl.info/. Enter V271 in the search box to learn more about \"Foot Sprain: Care Instructions. \"  Current as of: June 26, 2019  Content Version: 12.2  © 4150-8097 [employer]. Care instructions adapted under license by [employer] (which disclaims liability or warranty for this information). If you have questions about a medical condition or this instruction, always ask your healthcare professional. [address] any warranty or liability for your use of this information.

## 2019-12-25 NOTE — ED PROVIDER NOTES
This patient presents about 24 hours after an injury to her right foot. She had stumbled and landed on the right foot on the floor and dorsiflexed it. Her knee was straight on that side. She has some soreness to the lower back into the right knee. The lower back has hurt before because she has scoliosis. She tried Tylenol and ibuprofen with some transient relief earlier. She also tried ice. No history of right foot injury.       Foot Pain           Past Medical History:   Diagnosis Date    Neurological disorder     headaches    Scoliosis, unspecified     Patient told she has an abnormal curve to her lower back, undergoes physical therapy for lower back pain       Past Surgical History:   Procedure Laterality Date    HX HERNIA REPAIR  2015    HX TUBAL LIGATION  2015         Family History:   Problem Relation Age of Onset    Thyroid Disease Mother     Diabetes Maternal Grandmother     Diabetes Maternal Grandfather     Thyroid Disease Sister        Social History     Socioeconomic History    Marital status:      Spouse name: Not on file    Number of children: 3    Years of education: Not on file    Highest education level: Not on file   Occupational History    Occupation: Housekeeping     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    Social Needs    Financial resource strain: Not on file    Food insecurity:     Worry: Not on file     Inability: Not on file    Transportation needs:     Medical: Not on file     Non-medical: Not on file   Tobacco Use    Smoking status: Never Smoker    Smokeless tobacco: Never Used   Substance and Sexual Activity    Alcohol use: No    Drug use: No    Sexual activity: Yes     Partners: Male     Comment: tubal ligation, 2015   Lifestyle    Physical activity:     Days per week: Not on file     Minutes per session: Not on file    Stress: Not on file   Relationships    Social connections:     Talks on phone: Not on file     Gets together: Not on file     Attends Zoroastrian service: Not on file     Active member of club or organization: Not on file     Attends meetings of clubs or organizations: Not on file     Relationship status: Not on file    Intimate partner violence:     Fear of current or ex partner: Not on file     Emotionally abused: Not on file     Physically abused: Not on file     Forced sexual activity: Not on file   Other Topics Concern    Not on file   Social History Narrative    Not on file         ALLERGIES: Patient has no known allergies. Review of Systems    Vitals:    12/25/19 1700   BP: 115/70   Pulse: 76   Resp: 16   Temp: 98.8 °F (37.1 °C)   SpO2: 100%   Weight: 49.7 kg (109 lb 9.1 oz)   Height: 5' 3\" (1.6 m)            Physical Exam  Constitutional:       Appearance: Normal appearance. Cardiovascular:      Rate and Rhythm: Normal rate. Pulses: Normal pulses. Pulmonary:      Effort: Pulmonary effort is normal.   Skin:     Capillary Refill: Capillary refill takes less than 2 seconds. Neurological:      General: No focal deficit present. Mental Status: She is alert. Psychiatric:         Mood and Affect: Mood normal.     Right foot: Atraumatic. A little tender on the dorsum. No bruising or swelling. Distal neurovascular exam is normal.  Right knee: Full range of motion. No effusion. No warmth. Looks to be atraumatic. MDM       Procedures          Reviewed x-ray images. Will wrap her right foot with an Ace wrap. No indication for her need to be imaged. She works in 2 days. RICE at home.

## 2020-04-06 ENCOUNTER — HOSPITAL ENCOUNTER (EMERGENCY)
Age: 30
Discharge: HOME | End: 2020-04-06
Attending: EMERGENCY MEDICINE
Payer: COMMERCIAL

## 2020-04-06 VITALS
TEMPERATURE: 99.2 F | BODY MASS INDEX: 19.12 KG/M2 | RESPIRATION RATE: 14 BRPM | DIASTOLIC BLOOD PRESSURE: 40 MMHG | HEART RATE: 82 BPM | SYSTOLIC BLOOD PRESSURE: 108 MMHG | HEIGHT: 64 IN | WEIGHT: 112 LBS | OXYGEN SATURATION: 100 %

## 2020-04-06 DIAGNOSIS — R10.84 ABDOMINAL PAIN, GENERALIZED: Primary | ICD-10-CM

## 2020-04-06 DIAGNOSIS — K59.00 CONSTIPATION, UNSPECIFIED CONSTIPATION TYPE: ICD-10-CM

## 2020-04-06 DIAGNOSIS — K62.89 RECTAL PAIN: ICD-10-CM

## 2020-04-06 PROCEDURE — 99282 EMERGENCY DEPT VISIT SF MDM: CPT

## 2020-04-06 RX ORDER — DICYCLOMINE HYDROCHLORIDE 10 MG/1
10 CAPSULE ORAL 4 TIMES DAILY
Qty: 12 CAP | Refills: 0 | Status: SHIPPED | OUTPATIENT
Start: 2020-04-06 | End: 2020-04-09

## 2020-04-06 RX ORDER — MAGNESIUM CITRATE
296 SOLUTION, ORAL ORAL
Qty: 1 BOTTLE | Refills: 0 | Status: SHIPPED | OUTPATIENT
Start: 2020-04-06 | End: 2020-04-06

## 2020-04-06 NOTE — ED TRIAGE NOTES
Patient complains of constipation since 1330 today. Patient reports last bowel movement yesterday and has consumed breakfast and prune juice with no vomiting.

## 2020-04-06 NOTE — ED NOTES
The patient was discharged home by provider in stable condition. The patient is alert and oriented, in no respiratory distress. The patient's diagnosis, condition and treatment were explained. The patient expressed understanding. Three prescriptions given. No work/school note given. A discharge plan has been developed. A  was not involved in the process. Aftercare instructions were given. Patient ambulatory out of the ED.

## 2020-04-06 NOTE — ED PROVIDER NOTES
The history is provided by the patient. Constipation    This is a new problem. The current episode started 3 to 5 hours ago. Associated symptoms include abdominal pain, flatus, abdominal distention, chills, nausea and constipation. Pertinent negatives include no fever, no vomiting and no diarrhea. She has tried enemas for the symptoms. The treatment provided no relief.         Past Medical History:   Diagnosis Date    Neurological disorder     headaches    Scoliosis, unspecified     Patient told she has an abnormal curve to her lower back, undergoes physical therapy for lower back pain       Past Surgical History:   Procedure Laterality Date    HX HERNIA REPAIR  2015    HX TUBAL LIGATION  2015         Family History:   Problem Relation Age of Onset    Thyroid Disease Mother     Diabetes Maternal Grandmother     Diabetes Maternal Grandfather     Thyroid Disease Sister        Social History     Socioeconomic History    Marital status:      Spouse name: Not on file    Number of children: 3    Years of education: Not on file    Highest education level: Not on file   Occupational History    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    Social Needs    Financial resource strain: Not on file    Food insecurity     Worry: Not on file     Inability: Not on file   [place] needs     Medical: Not on file     Non-medical: Not on file   Tobacco Use    Smoking status: Never Smoker    Smokeless tobacco: Never Used   Substance and Sexual Activity    Alcohol use: No    Drug use: No    Sexual activity: Yes     Partners: Male     Comment: tubal ligation, 2015   Lifestyle    Physical activity     Days per week: Not on file     Minutes per session: Not on file    Stress: Not on file   Relationships    Social connections     Talks on phone: Not on file     Gets together: Not on file     Attends Gnosticist service: Not on file     Active member of club or organization: Not on file     Attends meetings of clubs or organizations: Not on file     Relationship status: Not on file    Intimate partner violence     Fear of current or ex partner: Not on file     Emotionally abused: Not on file     Physically abused: Not on file     Forced sexual activity: Not on file   Other Topics Concern    Not on file   Social History Narrative    Not on file         ALLERGIES: Patient has no known allergies. Review of Systems   Constitutional: Positive for chills. Negative for fever. Respiratory: Negative for shortness of breath. Cardiovascular: Negative for chest pain. Gastrointestinal: Positive for abdominal distention, abdominal pain, constipation, flatus and nausea. Negative for diarrhea and vomiting. Neurological: Negative for dizziness and light-headedness. All other systems reviewed and are negative. Vitals:    04/06/20 1925   BP: 108/40   Pulse: 82   Resp: 14   Temp: 99.2 °F (37.3 °C)   SpO2: 100%   Weight: 50.8 kg (112 lb)   Height: 5' 4\" (1.626 m)            Physical Exam  Vitals signs and nursing note reviewed. Constitutional:       Appearance: She is well-developed. HENT:      Head: Normocephalic and atraumatic. Eyes:      General: No scleral icterus. Neck:      Musculoskeletal: Normal range of motion. Cardiovascular:      Rate and Rhythm: Normal rate. Pulmonary:      Effort: Pulmonary effort is normal.   Abdominal:      General: Abdomen is flat. There is no distension. Palpations: Abdomen is soft. There is no mass. Tenderness: There is no abdominal tenderness. There is no guarding or rebound. Genitourinary:     Rectum: Normal. No anal fissure or external hemorrhoid. Normal anal tone. Skin:     Findings: No erythema or rash. Neurological:      General: No focal deficit present. Mental Status: She is alert and oriented to person, place, and time.    Psychiatric:         Mood and Affect: Mood normal.         Behavior: Behavior normal.          MDM  Number of Diagnoses or Management Options  Abdominal pain, generalized:   Constipation, unspecified constipation type:   Rectal pain:   Diagnosis management comments: Patient discharged with a treatment plan for her constipation and associated abdominal cramping. The history, exam, diagnostic testing, and current condition do not suggest acute appendicitis, bowel obstruction, incarcerated hernia, acute cholecystitis, bowel perforation, major gastrointestinal bleeding, severe diverticulitis, sepsis, or other significant pathology to warrant further testing, continued ED treatment, admission, or surgical evaluation at this point. The vital signs have been stable and are within normal limits at this time. The patient does not have uncontrollable pain, intractable vomiting, or other significant symptoms. The patient's condition is stable and appropriate for discharge. The patient will pursue further outpatient evaluation with the primary care physician or other designated or consulting physician as indicated in the discharge instructions.            Procedures

## 2020-04-07 ENCOUNTER — PATIENT OUTREACH (OUTPATIENT)
Dept: OTHER | Age: 30
End: 2020-04-07

## 2020-04-07 NOTE — PROGRESS NOTES
Initial HPRP:   Patient on report as discharged from [facility] ED Visit 4/6/20 for Abdominal Pain. Initial attempt to contact patient for transitions of care.  Left discreet message on voicemail with this Care Coordinator's contact information.  Will attempt outreach on 4/8/20.          dicyclomine 10 mg capsule   magnesium citrate solution   witch hazel-glycerin (hamamel) Padm    Call 911 anytime you think you may need emergency care. For example, call if:   You passed out (lost consciousness).  You pass maroon or very bloody stools.  You vomit blood or what looks like coffee grounds.  You have new, severe belly pain. Call your doctor now or seek immediate medical care if:  You have new or worse pain. Your pain gets worse, especially if it becomes focused in one area of  your belly. You have new or worse nausea or vomiting  You have a new or higher fever. Your stools are black and look like tar, or they have streaks of blood. You have new or worse bleeding from the rectum. You have trouble passing stools. Your constipation is getting worse. You are not getting better after 1 day (24 hours).

## 2020-04-08 ENCOUNTER — PATIENT OUTREACH (OUTPATIENT)
Dept: OTHER | Age: 30
End: 2020-04-08

## 2020-04-08 NOTE — PROGRESS NOTES
Patient identified as eligible for [address] [place] [address] services. Second telephone outreach attempted. Left discreet voicemail with this CM confidential contact information. Will send UTR letter via Mail. Next Outreach 4/22/20 f/u - Abdominal Pain/Constipation.

## 2020-04-08 NOTE — LETTER
4/8/2020 10:21 AM 
 
Ms. Amy Kumar [address] [phone] Dear Amy Rhoades My name is Erin Mcfarland, Employee Care Coordinator for [facility] and I have been trying to reach you. The Employee Care Management [facility]) program is a free-of-charge confidential service provided to our employees and their family members covered by the [facility]. . The program will provide an employee and his/her family with the [facility] expertise to assist in navigating the health care delivery system, provider services, and their overall care needsso as to assure and improve health care interactions and enhance the quality of life. This program is designed to provide you with the opportunity to have a [facility] care manager partner with you for the following services: 
 
 1) when you come home from the hospital or emergency room 2) when help is needed to manage your disease 3) when you need assistance coordinating services or appointments [facility] is dedicated to empowering the good health of its community and improving the quality of care and care experiences for employees and their families. We are committed to safeguarding patient confidentiality and privacy, assuring that every employee has the respect he or she deserves in managing their health. The information shared with your care manager will not be shared with anyone else aside from those you identify as part of your care team, and will only be used to assist you with any identified care needs. Please contact me if you would like this service provided to you. Sincerely, 
 
Ray Avilez LPN  [place] [facility] Care Coordinator [address] [facility]  [address] C [phone]  F [phone]  [email or web address]. [email or web address]

## 2020-04-30 ENCOUNTER — PATIENT OUTREACH (OUTPATIENT)
Dept: OTHER | Age: 30
End: 2020-04-30

## 2020-04-30 NOTE — PROGRESS NOTES
HPRP f/u:  Telephone attempt to contact patient for Health Promotion and Risk Prevention. Left discreet message on voicemail with this CC contact information. Will follow for one month for transitions of care needs. Next outreach is 5/14/20 for discussion f/u - Abdominal Pain/Constipation and Resolve Episode.

## 2020-05-14 ENCOUNTER — PATIENT OUTREACH (OUTPATIENT)
Dept: OTHER | Age: 30
End: 2020-05-14

## 2020-05-14 NOTE — LETTER
5/14/2020 8:26 AM 
 
Ms. Kamini Kumar [address] [phone] Dear Claytoncarlos Audradanielleabisai My name is Markos Harris,  Employee Care Coordinator for [facility] [place], and I have been trying to reach you. The Employee Care Management [facility]) program is a free-of-charge, confidential service provided to our employees and their family members covered by the [facility]. I can help you with care transitions such as when you come home from the hospital, when help is needed to manage your disease, or when you need assistance coordinating services or appointments. As healthcare providers, we know that patients do better when they have close follow up with a primary care provider (PCP). I can help you find one that is convenient to you and covered by your insurance. I can also help you understand any after visit instructions, such as what symptoms to watch out for, or any new or changed medications. We can work together using your preferred communication -- telephone, email, We[employer]. If you do not have a [employer] account, I can help you request access. Our program is designed to provide you with the opportunity to have a [facility] care manager partner with you for your healthcare needs. Due to not being able to reach you, I am closing out the current program, but will remain available to you should you have any questions. Please contact me at the below number if I can provide you with assistance for any of the above services. Sincerely, 
 
Riri Yi LPN  [place] [facility] Care Coordinator [facility] [address] [facility]  [address] C [phone]  F [phone]  [email or web address]. [email or web address]

## 2020-07-13 ENCOUNTER — TELEPHONE (OUTPATIENT)
Dept: FAMILY MEDICINE CLINIC | Age: 30
End: 2020-07-13

## 2020-07-13 NOTE — TELEPHONE ENCOUNTER
----- Message from Pasquale Tai sent at 7/10/2020  1:42 PM EDT -----  Regarding: [facility] OFF[facility]  Appointment not available    Caller's first and last name and relationship to patient (if not the patient):      Best contact number: [phone]      Preferred date and time: 07/10/2020      Scheduled appointment date and time: N/A      Reason for appointment: Anxiety/RX Consultation       Details to clarify the request:       Pasquale Tai

## 2022-03-18 PROBLEM — R87.820 PAPANICOLAOU SMEAR OF CERVIX WITH LOW RISK HUMAN PAPILLOMAVIRUS (HPV) DNA TEST POSITIVE: Status: ACTIVE | Noted: 2018-10-01

## 2023-05-18 RX ORDER — BUTALBITAL, ACETAMINOPHEN AND CAFFEINE 300; 40; 50 MG/1; MG/1; MG/1
1 CAPSULE ORAL EVERY 6 HOURS PRN
COMMUNITY
Start: 2019-06-06